# Patient Record
Sex: MALE | Race: WHITE | NOT HISPANIC OR LATINO | URBAN - METROPOLITAN AREA
[De-identification: names, ages, dates, MRNs, and addresses within clinical notes are randomized per-mention and may not be internally consistent; named-entity substitution may affect disease eponyms.]

---

## 2018-03-04 ENCOUNTER — EMERGENCY (EMERGENCY)
Facility: HOSPITAL | Age: 63
LOS: 1 days | Discharge: HOME | End: 2018-03-04
Attending: EMERGENCY MEDICINE | Admitting: EMERGENCY MEDICINE
Payer: MEDICARE

## 2018-03-04 VITALS
HEART RATE: 87 BPM | TEMPERATURE: 98 F | SYSTOLIC BLOOD PRESSURE: 145 MMHG | DIASTOLIC BLOOD PRESSURE: 74 MMHG | RESPIRATION RATE: 16 BRPM | OXYGEN SATURATION: 99 %

## 2018-03-04 VITALS
TEMPERATURE: 98 F | RESPIRATION RATE: 18 BRPM | HEART RATE: 64 BPM | SYSTOLIC BLOOD PRESSURE: 123 MMHG | DIASTOLIC BLOOD PRESSURE: 69 MMHG | OXYGEN SATURATION: 97 %

## 2018-03-04 DIAGNOSIS — R69 ILLNESS, UNSPECIFIED: ICD-10-CM

## 2018-03-04 DIAGNOSIS — F33.2 MAJOR DEPRESSIVE DISORDER, RECURRENT SEVERE WITHOUT PSYCHOTIC FEATURES: ICD-10-CM

## 2018-03-04 PROCEDURE — 90792 PSYCH DIAG EVAL W/MED SRVCS: CPT

## 2018-03-04 PROCEDURE — 99283 EMERGENCY DEPT VISIT LOW MDM: CPT

## 2018-03-04 PROCEDURE — 99284 EMERGENCY DEPT VISIT MOD MDM: CPT

## 2018-03-04 NOTE — ED BEHAVIORAL HEALTH ASSESSMENT NOTE - SUMMARY
Patient a 63 y/o single  male, unemployed, on SSD, domiciled at [facility] since yesterday, no prior drug/legal abuse, has SI, never tried to commit Suicide, no self aggravation or self mutilation habits, medically has CKD, plans to start HD tomorrow, with past psychiatric hx fo MDD with 4 past psychiatric hospitalizations, last ED visit was in  and was discharged from ED at [place].    Patient is alone, his parents , his brother  in , has no contact with his sister, was discharged from [facility] yesterday after a 7-10 stay where he had AVF in the Right Fore-arm and Rt. Jugular Vein for IV access to HD starting tomorrow. he is depressed for years, under the treatment at [facility] [place], sees a therapist once a week on  @ 9:45 AM and also sees his psychiatrist at least from once a week to a month depending upon his needs. He has SI for years, but never acted on them, denied drug abuse, complain t with meds, takes Zoloft/Remron as prescribed. he just mentioned to the rehab and Dialysis center and was sent in here. He denied current SI and contracted for safety, he took his meds today AM and plans to resume HD @ [facility] starting tomorrow, and also to see therapist Lindsey on Friday @ 9:45 AM.     He denied A/V/H or paranoid beliefs, no manic or hypo-manic symptoms are elicited

## 2018-03-04 NOTE — ED BEHAVIORAL HEALTH NOTE - BEHAVIORAL HEALTH NOTE
Sheree: pt seen by psych MD (Ruby) pt T&R ,pt has an appt on March 09,2018 with therapist Danya at [facility]. Transportation assistance requested,worker will call [employer].

## 2018-03-04 NOTE — ED PROVIDER NOTE - OBJECTIVE STATEMENT
63 y/o M pt with hx of HTN and PVD presents to ED from [facility] for making suicidal statements 1 week ago. Pt was seen at [facility] 1 week ago and was put in the hallway against the wall. There was a phone near his bed and he said he wanted to wrap the cord around his neck. He states he said it for attention and he is not suicidal. Pt's rehab facility found out today that he made suicidal statements 1 week ago and sent him to ED for evaluation and psych clearance. He states he has no plan or intention to harm himself.  Denies SI, HI. No further complaints at this time.

## 2018-03-04 NOTE — ED ADULT NURSE NOTE - CHIEF COMPLAINT QUOTE
[facility] for psych eval for statements pt stated yesterday about hurting himself. pt denies any suicidal thoughts at this time, md shaw at bedside

## 2018-03-04 NOTE — ED ADULT NURSE NOTE - OBJECTIVE STATEMENT
received pt medically cleared by ED attending, pt awake alert and cooperative, full affect, pt speaks coherently and mentates well, pt states he was sent for psych eval because he told staff at [facility] that he has a "History of suicidal thoughts" pt was brought there yesterday for P/T rehab and dialysis, states he told staff that he does "not have any current suicidal thoughts" but was assigned a 1:1 by nursing staff. pt has had suicidal ideations for the past 5 years with no suicide attempts,  pt denies any SI, HI, or AVH at this time, pt next dialysis appointment is Tuesday.

## 2018-03-04 NOTE — ED ADULT TRIAGE NOTE - CHIEF COMPLAINT QUOTE
[facility] for psych eval for statements pt stated yesterday about hurting himself. pt denies any suicidal thoughts at this time, md shaw at bedside

## 2018-03-04 NOTE — ED PROVIDER NOTE - MEDICAL DECISION MAKING DETAILS
Pt with hx of suicidal statement 1 week ago, sent for psych eval. Pt was seen, evaluated and cleared by psych for discharge

## 2018-03-04 NOTE — ED BEHAVIORAL HEALTH ASSESSMENT NOTE - HPI (INCLUDE ILLNESS QUALITY, SEVERITY, DURATION, TIMING, CONTEXT, MODIFYING FACTORS, ASSOCIATED SIGNS AND SYMPTOMS)
Patient a 61 y/o single  male, unemployed, on SSD, domiciled at [facility] since yesterday, no prior drug/legal abuse, has SI, never tried to commit Suicide, no self aggravation or self mutilation habits, medically has CKD, plans to start HD tomorrow, with past psychiatric hx fo MDD with 4 past psychiatric hospitalizations, last ED visit was in  and was discharged from ED at [place].    Patient is alone, his parents , his brother  in , has no contact with his sister, was discharged from [facility] yesterday after a 7-10 stay where he had AVF in the Right Fore-arm and Rt. Jugular Vein for IV access to HD starting tomorrow. he is depressed for years, under the treatment at [facility] [place], sees a therapist once a week on  @ 9:45 AM and also sees his psychiatrist at least from once a week to a month depending upon his needs. He has SI for years, but never acted on them, denied drug abuse, complain t with meds, takes Zoloft/Remron as prescribed. he just mentioned to the rehab and Dialysis center and was sent in here. He denied current SI and contracted for safety, he took his meds today AM and plans to resume HD @ [facility] starting tomorrow, and also to see therapist Lindsey on Friday @ 9:45 AM.     He denied A/V/H or paranoid beliefs, no manic or hypo-manic symptoms are elicited Patient a 61 y/o single  male, unemployed, on SSD, domiciled at [facility] [place] [facility] since yesterday, no prior drug/legal abuse, has SI, never tried to commit Suicide, no self aggravation or self mutilation habits, medically has CKD, plans to start HD tomorrow, with past psychiatric hx fo MDD with 4 past psychiatric hospitalizations, last ED visit was in 2016 and was discharged from ED at [place].    Patient is alone, his parents , his brother  in , has no contact with his sister, was discharged from [facility] yesterday after a 7-10 stay where he had AVF in the Right Fore-arm and Rt. Jugular Vein for IV access to HD starting tomorrow. he is depressed for years, under the treatment at [facility] [place], sees a therapist once a week on  @ 9:45 AM and also sees his psychiatrist at least from once a week to a month depending upon his needs. He has SI for years, but never acted on them, denied drug abuse, complain t with meds, takes Zoloft/Remron as prescribed. he just mentioned to the rehab and Dialysis center and was sent in here. He denied current SI and contracted for safety, he took his meds today AM and plans to resume HD @ [facility] [place] starting tomorrow, and also to see therapist Lindsey on Friday @ 9:45 AM.     He denied A/V/H or paranoid beliefs, no manic or hypo-manic symptoms are elicited.    Currently he is stable, not S/H at this time, he contracted for safety and plans to resume his HD tomorrow at Rehab[facility]Dialysis @ [facility] [place].

## 2018-10-10 ENCOUNTER — APPOINTMENT (OUTPATIENT)
Dept: NEUROLOGY | Facility: CLINIC | Age: 63
End: 2018-10-10
Payer: MEDICARE

## 2018-10-10 VITALS
DIASTOLIC BLOOD PRESSURE: 70 MMHG | BODY MASS INDEX: 39.08 KG/M2 | WEIGHT: 273 LBS | SYSTOLIC BLOOD PRESSURE: 132 MMHG | HEIGHT: 70 IN

## 2018-10-10 DIAGNOSIS — Z86.79 PERSONAL HISTORY OF OTHER DISEASES OF THE CIRCULATORY SYSTEM: ICD-10-CM

## 2018-10-10 DIAGNOSIS — Z99.2 DEPENDENCE ON RENAL DIALYSIS: ICD-10-CM

## 2018-10-10 DIAGNOSIS — G72.9 MYOPATHY, UNSPECIFIED: ICD-10-CM

## 2018-10-10 PROCEDURE — 99204 OFFICE O/P NEW MOD 45 MIN: CPT

## 2018-10-10 RX ORDER — SEVELAMER CARBONATE 800 MG/1
0.8 POWDER, FOR SUSPENSION ORAL
Refills: 0 | Status: ACTIVE | COMMUNITY

## 2018-10-10 RX ORDER — NIFEDIPINE 90 MG/1
90 TABLET, EXTENDED RELEASE ORAL
Refills: 0 | Status: ACTIVE | COMMUNITY

## 2018-10-10 RX ORDER — VIT B COMP NO.3/FOLIC/C/BIOTIN 1 MG-60 MG
TABLET ORAL
Refills: 0 | Status: ACTIVE | COMMUNITY

## 2018-10-10 RX ORDER — SERTRALINE HYDROCHLORIDE 25 MG/1
TABLET, FILM COATED ORAL
Refills: 0 | Status: ACTIVE | COMMUNITY

## 2018-11-23 NOTE — ED BEHAVIORAL HEALTH ASSESSMENT NOTE - REFERRAL / APPOINTMENT DETAILS
Clinic Care Coordination Contact  [facility]       Clinical Data: Care Coordinator Outreach  Outreach attempted x 1.  Left message on voicemail with call back information and requested return call.       Care Coordinator reviewed chart and patient discharged from [facility] on [place], with a plan to move to [facility] with hospice involved. Stopping cancer treatment. Recommended to follow up with PCP in 7 days. Told patient to schedule an appt in voicemail.     Care Coordinator will plan to follow up in one week to check on psychosocial status and if any other support/resources are needed.    Mari Otoole, MSW, [facility]  Clinic Care Coordinator  [email or web address]  [phone]       [facility] @ [place] on Friday 9:45 AM with therapist Lindsey

## 2018-11-28 ENCOUNTER — APPOINTMENT (OUTPATIENT)
Dept: NEUROLOGY | Facility: CLINIC | Age: 63
End: 2018-11-28
Payer: MEDICARE

## 2018-11-28 PROCEDURE — 95911 NRV CNDJ TEST 9-10 STUDIES: CPT

## 2018-11-28 PROCEDURE — 95937 NEUROMUSCULAR JUNCTION TEST: CPT

## 2018-11-28 PROCEDURE — 95886 MUSC TEST DONE W/N TEST COMP: CPT

## 2019-02-01 ENCOUNTER — INPATIENT (INPATIENT)
Facility: HOSPITAL | Age: 64
LOS: 2 days | Discharge: HOME | End: 2019-02-04
Attending: HOSPITALIST | Admitting: HOSPITALIST
Payer: MEDICARE

## 2019-02-01 VITALS
HEART RATE: 76 BPM | TEMPERATURE: 98 F | HEIGHT: 70 IN | SYSTOLIC BLOOD PRESSURE: 125 MMHG | OXYGEN SATURATION: 100 % | RESPIRATION RATE: 19 BRPM | WEIGHT: 279.99 LBS | DIASTOLIC BLOOD PRESSURE: 90 MMHG

## 2019-02-01 DIAGNOSIS — E66.9 OBESITY, UNSPECIFIED: ICD-10-CM

## 2019-02-01 DIAGNOSIS — N18.6 END STAGE RENAL DISEASE: ICD-10-CM

## 2019-02-01 DIAGNOSIS — I25.10 ATHEROSCLEROTIC HEART DISEASE OF NATIVE CORONARY ARTERY WITHOUT ANGINA PECTORIS: ICD-10-CM

## 2019-02-01 DIAGNOSIS — D63.1 ANEMIA IN CHRONIC KIDNEY DISEASE: ICD-10-CM

## 2019-02-01 DIAGNOSIS — R20.0 ANESTHESIA OF SKIN: ICD-10-CM

## 2019-02-01 DIAGNOSIS — R29.810 FACIAL WEAKNESS: ICD-10-CM

## 2019-02-01 DIAGNOSIS — I73.9 PERIPHERAL VASCULAR DISEASE, UNSPECIFIED: ICD-10-CM

## 2019-02-01 DIAGNOSIS — F32.9 MAJOR DEPRESSIVE DISORDER, SINGLE EPISODE, UNSPECIFIED: ICD-10-CM

## 2019-02-01 DIAGNOSIS — I49.3 VENTRICULAR PREMATURE DEPOLARIZATION: ICD-10-CM

## 2019-02-01 DIAGNOSIS — D50.9 IRON DEFICIENCY ANEMIA, UNSPECIFIED: ICD-10-CM

## 2019-02-01 DIAGNOSIS — Z88.8 ALLERGY STATUS TO OTHER DRUGS, MEDICAMENTS AND BIOLOGICAL SUBSTANCES: ICD-10-CM

## 2019-02-01 DIAGNOSIS — I44.7 LEFT BUNDLE-BRANCH BLOCK, UNSPECIFIED: ICD-10-CM

## 2019-02-01 DIAGNOSIS — I12.0 HYPERTENSIVE CHRONIC KIDNEY DISEASE WITH STAGE 5 CHRONIC KIDNEY DISEASE OR END STAGE RENAL DISEASE: ICD-10-CM

## 2019-02-01 DIAGNOSIS — I69.392 FACIAL WEAKNESS FOLLOWING CEREBRAL INFARCTION: ICD-10-CM

## 2019-02-01 LAB
ADD ON TEST-SPECIMEN IN LAB: SIGNIFICANT CHANGE UP
ALBUMIN SERPL ELPH-MCNC: 3.2 G/DL — LOW (ref 3.3–5)
ALP SERPL-CCNC: 119 U/L — SIGNIFICANT CHANGE UP (ref 40–120)
ALT FLD-CCNC: 25 U/L — SIGNIFICANT CHANGE UP (ref 12–78)
ANION GAP SERPL CALC-SCNC: 8 MMOL/L — SIGNIFICANT CHANGE UP (ref 5–17)
APTT BLD: 26.3 SEC — LOW (ref 27.5–36.3)
AST SERPL-CCNC: 19 U/L — SIGNIFICANT CHANGE UP (ref 15–37)
BASOPHILS # BLD AUTO: 0.06 K/UL — SIGNIFICANT CHANGE UP (ref 0–0.2)
BASOPHILS NFR BLD AUTO: 0.5 % — SIGNIFICANT CHANGE UP (ref 0–2)
BILIRUB SERPL-MCNC: 0.4 MG/DL — SIGNIFICANT CHANGE UP (ref 0.2–1.2)
BUN SERPL-MCNC: 35 MG/DL — HIGH (ref 7–23)
CALCIUM SERPL-MCNC: 8.6 MG/DL — SIGNIFICANT CHANGE UP (ref 8.5–10.1)
CHLORIDE SERPL-SCNC: 104 MMOL/L — SIGNIFICANT CHANGE UP (ref 96–108)
CO2 SERPL-SCNC: 28 MMOL/L — SIGNIFICANT CHANGE UP (ref 22–31)
CREAT SERPL-MCNC: 7.47 MG/DL — HIGH (ref 0.5–1.3)
EOSINOPHIL # BLD AUTO: 0.45 K/UL — SIGNIFICANT CHANGE UP (ref 0–0.5)
EOSINOPHIL NFR BLD AUTO: 4 % — SIGNIFICANT CHANGE UP (ref 0–6)
GLUCOSE SERPL-MCNC: 82 MG/DL — SIGNIFICANT CHANGE UP (ref 70–99)
HCT VFR BLD CALC: 33.5 % — LOW (ref 39–50)
HGB BLD-MCNC: 11.3 G/DL — LOW (ref 13–17)
IMM GRANULOCYTES NFR BLD AUTO: 0.4 % — SIGNIFICANT CHANGE UP (ref 0–1.5)
INR BLD: 1.1 RATIO — SIGNIFICANT CHANGE UP (ref 0.88–1.16)
LYMPHOCYTES # BLD AUTO: 19.6 % — SIGNIFICANT CHANGE UP (ref 13–44)
LYMPHOCYTES # BLD AUTO: 2.2 K/UL — SIGNIFICANT CHANGE UP (ref 1–3.3)
MCHC RBC-ENTMCNC: 32.6 PG — SIGNIFICANT CHANGE UP (ref 27–34)
MCHC RBC-ENTMCNC: 33.7 GM/DL — SIGNIFICANT CHANGE UP (ref 32–36)
MCV RBC AUTO: 96.5 FL — SIGNIFICANT CHANGE UP (ref 80–100)
MONOCYTES # BLD AUTO: 1.01 K/UL — HIGH (ref 0–0.9)
MONOCYTES NFR BLD AUTO: 9 % — SIGNIFICANT CHANGE UP (ref 2–14)
NEUTROPHILS # BLD AUTO: 7.44 K/UL — HIGH (ref 1.8–7.4)
NEUTROPHILS NFR BLD AUTO: 66.5 % — SIGNIFICANT CHANGE UP (ref 43–77)
NRBC # BLD: 0 /100 WBCS — SIGNIFICANT CHANGE UP (ref 0–0)
PLATELET # BLD AUTO: 301 K/UL — SIGNIFICANT CHANGE UP (ref 150–400)
POTASSIUM SERPL-MCNC: 3.8 MMOL/L — SIGNIFICANT CHANGE UP (ref 3.5–5.3)
POTASSIUM SERPL-SCNC: 3.8 MMOL/L — SIGNIFICANT CHANGE UP (ref 3.5–5.3)
PROT SERPL-MCNC: 8.2 GM/DL — SIGNIFICANT CHANGE UP (ref 6–8.3)
PROTHROM AB SERPL-ACNC: 12.3 SEC — SIGNIFICANT CHANGE UP (ref 10–12.9)
RBC # BLD: 3.47 M/UL — LOW (ref 4.2–5.8)
RBC # FLD: 13.4 % — SIGNIFICANT CHANGE UP (ref 10.3–14.5)
SODIUM SERPL-SCNC: 140 MMOL/L — SIGNIFICANT CHANGE UP (ref 135–145)
TROPONIN I SERPL-MCNC: <0.015 NG/ML — SIGNIFICANT CHANGE UP (ref 0.01–0.04)
TSH SERPL-MCNC: 1.25 UU/ML — SIGNIFICANT CHANGE UP (ref 0.34–4.82)
WBC # BLD: 11.21 K/UL — HIGH (ref 3.8–10.5)
WBC # FLD AUTO: 11.21 K/UL — HIGH (ref 3.8–10.5)

## 2019-02-01 PROCEDURE — 70551 MRI BRAIN STEM W/O DYE: CPT | Mod: 26

## 2019-02-01 PROCEDURE — 72198 MR ANGIO PELVIS W/O & W/DYE: CPT | Mod: 26

## 2019-02-01 PROCEDURE — 93010 ELECTROCARDIOGRAM REPORT: CPT

## 2019-02-01 PROCEDURE — 99283 EMERGENCY DEPT VISIT LOW MDM: CPT

## 2019-02-01 PROCEDURE — 99291 CRITICAL CARE FIRST HOUR: CPT

## 2019-02-01 PROCEDURE — 70547 MR ANGIOGRAPHY NECK W/O DYE: CPT | Mod: 26

## 2019-02-01 PROCEDURE — 71045 X-RAY EXAM CHEST 1 VIEW: CPT | Mod: 26

## 2019-02-01 PROCEDURE — 70450 CT HEAD/BRAIN W/O DYE: CPT | Mod: 26

## 2019-02-01 RX ORDER — ASPIRIN/CALCIUM CARB/MAGNESIUM 324 MG
325 TABLET ORAL DAILY
Qty: 0 | Refills: 0 | Status: DISCONTINUED | OUTPATIENT
Start: 2019-02-02 | End: 2019-02-04

## 2019-02-01 RX ORDER — CALCIUM ACETATE 667 MG
1 TABLET ORAL
Qty: 0 | Refills: 0 | COMMUNITY

## 2019-02-01 RX ORDER — ATORVASTATIN CALCIUM 80 MG/1
40 TABLET, FILM COATED ORAL AT BEDTIME
Qty: 0 | Refills: 0 | Status: DISCONTINUED | OUTPATIENT
Start: 2019-02-01 | End: 2019-02-04

## 2019-02-01 RX ORDER — MIRTAZAPINE 45 MG/1
7.5 TABLET, ORALLY DISINTEGRATING ORAL AT BEDTIME
Qty: 0 | Refills: 0 | Status: DISCONTINUED | OUTPATIENT
Start: 2019-02-01 | End: 2019-02-04

## 2019-02-01 RX ORDER — SERTRALINE 25 MG/1
100 TABLET, FILM COATED ORAL DAILY
Qty: 0 | Refills: 0 | Status: DISCONTINUED | OUTPATIENT
Start: 2019-02-01 | End: 2019-02-04

## 2019-02-01 RX ORDER — ARIPIPRAZOLE 15 MG/1
2 TABLET ORAL DAILY
Qty: 0 | Refills: 0 | Status: DISCONTINUED | OUTPATIENT
Start: 2019-02-01 | End: 2019-02-01

## 2019-02-01 RX ORDER — PANTOPRAZOLE SODIUM 20 MG/1
40 TABLET, DELAYED RELEASE ORAL
Qty: 0 | Refills: 0 | Status: DISCONTINUED | OUTPATIENT
Start: 2019-02-01 | End: 2019-02-04

## 2019-02-01 RX ORDER — MAGNESIUM OXIDE 400 MG ORAL TABLET 241.3 MG
1 TABLET ORAL
Qty: 0 | Refills: 0 | COMMUNITY

## 2019-02-01 RX ORDER — SUCRALFATE 1 G
1 TABLET ORAL
Qty: 0 | Refills: 0 | COMMUNITY

## 2019-02-01 RX ORDER — NIFEDIPINE 30 MG
90 TABLET, EXTENDED RELEASE 24 HR ORAL DAILY
Qty: 0 | Refills: 0 | Status: DISCONTINUED | OUTPATIENT
Start: 2019-02-01 | End: 2019-02-04

## 2019-02-01 RX ORDER — MIRTAZAPINE 45 MG/1
1 TABLET, ORALLY DISINTEGRATING ORAL
Qty: 0 | Refills: 0 | COMMUNITY

## 2019-02-01 RX ORDER — PANTOPRAZOLE SODIUM 20 MG/1
1 TABLET, DELAYED RELEASE ORAL
Qty: 0 | Refills: 0 | COMMUNITY

## 2019-02-01 RX ORDER — DOCUSATE SODIUM 100 MG
100 CAPSULE ORAL THREE TIMES A DAY
Qty: 0 | Refills: 0 | Status: DISCONTINUED | OUTPATIENT
Start: 2019-02-01 | End: 2019-02-04

## 2019-02-01 RX ORDER — ARIPIPRAZOLE 15 MG/1
1 TABLET ORAL
Qty: 0 | Refills: 0 | COMMUNITY

## 2019-02-01 RX ORDER — SUCRALFATE 1 G
1 TABLET ORAL
Qty: 0 | Refills: 0 | Status: DISCONTINUED | OUTPATIENT
Start: 2019-02-01 | End: 2019-02-04

## 2019-02-01 RX ORDER — ACETAMINOPHEN 500 MG
650 TABLET ORAL EVERY 6 HOURS
Qty: 0 | Refills: 0 | Status: DISCONTINUED | OUTPATIENT
Start: 2019-02-01 | End: 2019-02-04

## 2019-02-01 RX ORDER — SERTRALINE 25 MG/1
2 TABLET, FILM COATED ORAL
Qty: 0 | Refills: 0 | COMMUNITY

## 2019-02-01 RX ORDER — MULTIVIT-MIN/FERROUS GLUCONATE 9 MG/15 ML
1 LIQUID (ML) ORAL
Qty: 0 | Refills: 0 | COMMUNITY

## 2019-02-01 RX ORDER — HYDRALAZINE HCL 50 MG
100 TABLET ORAL
Qty: 0 | Refills: 0 | Status: DISCONTINUED | OUTPATIENT
Start: 2019-02-01 | End: 2019-02-04

## 2019-02-01 RX ORDER — ASPIRIN/CALCIUM CARB/MAGNESIUM 324 MG
325 TABLET ORAL ONCE
Qty: 0 | Refills: 0 | Status: COMPLETED | OUTPATIENT
Start: 2019-02-01 | End: 2019-02-01

## 2019-02-01 RX ADMIN — Medication 325 MILLIGRAM(S): at 15:26

## 2019-02-01 RX ADMIN — Medication 100 MILLIGRAM(S): at 21:02

## 2019-02-01 RX ADMIN — Medication 0.1 MILLIGRAM(S): at 18:24

## 2019-02-01 RX ADMIN — Medication 1 GRAM(S): at 23:35

## 2019-02-01 RX ADMIN — Medication 1 GRAM(S): at 18:25

## 2019-02-01 RX ADMIN — SERTRALINE 100 MILLIGRAM(S): 25 TABLET, FILM COATED ORAL at 21:02

## 2019-02-01 RX ADMIN — Medication 90 MILLIGRAM(S): at 21:02

## 2019-02-01 RX ADMIN — ATORVASTATIN CALCIUM 40 MILLIGRAM(S): 80 TABLET, FILM COATED ORAL at 21:02

## 2019-02-01 NOTE — ED ADULT TRIAGE NOTE - SOURCE OF INFORMATION
December 18, 2018        Nader Chiu  [address]  [address]  [place] [address]  Phone: [phone]  Fax: [phone]             [facility] [address] [place] [address]  Phone: [phone]   Patient: Tim Richards   MR Number: [id number]   YOB: 1964   Date of Visit: 12/6/2018       Dear Dr. Nader Chiu    Thank you for referring Tim Richards to me for evaluation. Attached you will find relevant portions of my assessment and plan of care.    If you have questions, please do not hesitate to call me. I look forward to following Tim Richards along with you.    Sincerely,    Amy Rhodes MD    Enclosure    If you would like to receive this communication electronically, please contact [email or web address] or [phone] to request [employer] Link access.    [employer] Link is a tool which provides read-only access to select patient information with whom you have a relationship. Its easy to use and provides real time access to review your patients record including encounter summaries, notes, results, and demographic information.    If you feel you have received this communication in error or would no longer like to receive these types of communications, please e-mail [email or web address]       
Patient/EMS

## 2019-02-01 NOTE — ED ADULT NURSE NOTE - CHPI ED NUR SYMPTOMS NEG
no confusion/no change in level of consciousness/no loss of consciousness/no nausea/no numbness/no weakness/no blurred vision/no dizziness/no vomiting/no fever

## 2019-02-01 NOTE — ED ADULT NURSE NOTE - NSIMPLEMENTINTERV_GEN_ALL_ED
Implemented All Universal Safety Interventions:  [place] to call system. Call bell, personal items and telephone within reach. Instruct patient to call for assistance. Room bathroom lighting operational. Non-slip footwear when patient is off stretcher. Physically safe environment: no spills, clutter or unnecessary equipment. Stretcher in lowest position, wheels locked, appropriate side rails in place.

## 2019-02-01 NOTE — H&P ADULT - ASSESSMENT
63M.  admitted 02/01/2019.  presented from his psychotherapist's office in [place] to ED due to a L sided facial droop.  this was noted by the therapist (not the patient, as he purportedly never looks at himself in the mirror).  EMS was called.  Code stroke was not called at triage, has it was unknown onset of symptom.    PMHx:  HTN;  ESRT-RRT TTS + RUE AVF;  severe depression.    L facial droop.  new v. chronic.  -r/o TIA/CVA.  -HCT, no acute findings.  chronic lacunar infarction.  -MR head + MRA head and neck.  -ASA.  -statin.  -Neurology.    purported hx of "irregular" heart rhythm.  -r/o AF or atrial flutter.  -telemetry monitoring and serial EKGs.  -echocardiogram.  -EP evaluation, re:  consideration for implantable loop recorder.    mild normocytic anemia.  -recent hospitalization due to melena.  -monitor CBC and check stool OB.  -start Protonix.  -c/w sucralfate.    ESRT-RRT TTS + RUE AVF.  -Nephrology.    HTN.  -c/w hydralazine, clonidine and nifedipine.    severe depression.  -patient denied thoughts of harming himself.  -c/w sertraline and mirtazapine.    DVT prophylaxis.  -SCD.    disposition.  -telemetry to.    communication.  -RN.  -Case Management. 63M.  admitted 02/01/2019.  presented from his psychotherapist's office in [place] to ED due to a L sided facial droop.  this was noted by the therapist (not the patient, as he purportedly never looks at himself in the mirror).  EMS was called.  Code stroke was not called at triage, has it was unknown onset of symptom.    PMHx:  HTN;  ESRT-RRT TTS + RUE AVF;  severe depression.    L facial droop.  new v. chronic.  -r/o TIA/CVA.  -HCT, no acute findings.  chronic lacunar infarction.  -MR head + MRA head and neck.  -ASA.  -statin.  -Neurology.    hx of "irregular" heart rhythm.  -r/o AF or atrial flutter.  -telemetry monitoring and serial EKGs.  -echocardiogram.  -EP evaluation, re:  consideration for implantable loop recorder.    mild normocytic anemia.  -recent hospitalization due to melena.  -monitor CBC and check stool OB.  -start Protonix.  -c/w sucralfate.    ESRT-RRT TTS + RUE AVF.  -Nephrology.    HTN.  -c/w hydralazine, clonidine and nifedipine.    severe depression.  -patient denied thoughts of harming himself.  -c/w sertraline and mirtazapine.    DVT prophylaxis.  -SCD.    disposition.  -telemetry to.    communication.  -RN.  -Case Management.

## 2019-02-01 NOTE — ED ADULT TRIAGE NOTE - CHIEF COMPLAINT QUOTE
pt brought by EMS from therapist session for eval of left lip droop. no slurring of speech or dysarthria. pt evaled by resident Jeny on arrival. unknown time of onset.

## 2019-02-01 NOTE — ED PROVIDER NOTE - CLINICAL SUMMARY MEDICAL DECISION MAKING FREE TEXT BOX
Israel Fermin (Resident): ESRD and HTN w/ L facial droop, isolated - concern for minor stroke carlos. given patient had hx of poss a fib at OSH few weeks ago - no code stroke called, unknown last normal - will d/w neuro, CT head, labs, and admit as patient not on ASA/Plavix of HLD medication    JW Agree with A/P above.  Unclear last normal, ne focal deficit.  DDX CVA, CN 7 palsy, Nelson Palsy.  CT, metabolic and hematologic evaluation, neuro consultation, admission for risk stratification and secondary prevention.  Symptomatic treatment and reassessment.

## 2019-02-01 NOTE — H&P ADULT - HISTORY OF PRESENT ILLNESS
63M.  admitted 02/01/2019.  presented from his psychotherapist's office in [place] to ED due to a L sided facial droop.  this was noted by the therapist (not the patient, as he purportedly never looks at himself in the mirror).  EMS was called.  Code stroke was not called at triage, has it was unknown onset of symptom.    incidentally, patient was admitted to [facility] 3 weeks prior to current admission due to "black stool."  patient underwent an EGD which found an "old, nonbleeding ucler" according to patient.  additionally, we was informed he had an, "irregular heart beat."  cardiology w/u at that time was unrevealing and were unable to document a reoccurrence.      ROS:  (-) HA, visual changes, dysarthria, dysmetria, motor weakness or ataxia, recurrent melena or hematochezia, suicidal or homicidal ideations.    PMHx:  HTN;  ESRT-RRT TTS + RUE AVF;  depression.    PSHx:  RUE AVF;  L foot sx.    ALL:  doxazosin.    Rx:  reviewed.    SocHx:  on disability due to ESRD.  previously, worked numerous odd jobs.  denied tobacco, EtOH or illegal drugs.

## 2019-02-01 NOTE — ED PROVIDER NOTE - ATTENDING CONTRIBUTION TO CARE
GRZEGORZ Fisher MD,  performed the initial face to face bedside interview with this patient regarding history of present illness, review of symptoms and relevant past medical, social and family history.  I completed an independent physical examination.  I was the initial provider who evaluated this patient. I have signed out the follow up of any pending tests (i.e. labs, radiological studies) to the resident.  I have communicated the patient’s plan of care and disposition with the resident.

## 2019-02-01 NOTE — CONSULT NOTE ADULT - ASSESSMENT
63 year old man hx of HTN, CKD on HD, presents with an unknown onset left facial weakness, reduced sensory of LLE, ? old. r/o right CVA. No TPA due to unknown onset, min signs on exam.

## 2019-02-01 NOTE — ED PROVIDER NOTE - CRITICAL CARE PROVIDED
additional history taking/conducted a detailed discussion of DNR status/direct patient care (not related to procedure)/documentation/interpretation of diagnostic studies

## 2019-02-01 NOTE — ED PROVIDER NOTE - CARDIAC, MLM
Normal rate, regular rhythm.  Heart sounds S1, S2.  No murmurs, rubs or gallops. R arm fistula good thrill

## 2019-02-01 NOTE — ED PROVIDER NOTE - PROGRESS NOTE DETAILS
Justification for admission CVA.  Given presentation and findings, patient requires hospitalization.  I discussed all findings from our evaluation today with the patient and the medical necessity for admission to the hospital.  I addressed expectations regarding the admission and I discussed the plan of care in detail.  I addressed all questions and concerns regarding the admission and the plan of care.  I used verbal teach back to confirm understanding.  The patient agreed with the admission and the plan of care.  Pt signed out to Dr. D'Amico by resident.  Patient's history, vital signs, lab results, imaging, pertinent findings, and clinical condition reviewed during sign out.  At sign out patient admitted in hemodynamically stable condition in no acute distress.

## 2019-02-01 NOTE — ED PROVIDER NOTE - OBJECTIVE STATEMENT
62 y/o male hx of HTN and ESRD presents with L facial droop. Per patient, has severe depression and was at therapy where they noticed he had a L sided facial droop so called EMS. Patient never looks in mirror, and does not know when the facial droop started. Denies any vision changes, confusion, slurred speech, arm or leg weakness. Code stroke not called at triage as unknown last normal. Of note, was admitted to OSH 3 weeks ago, told he has an "irregular heart beat," but had a workup by cards and they were never able to prove recurrence of arrhythmia. 62 y/o male hx of HTN and ESRD presents with L facial droop. Per patient, has severe depression and was at therapy where they noticed he had a L sided facial droop so called EMS. Patient never looks in mirror, and does not know when the facial droop started. Denies any vision changes, confusion, slurred speech, arm or leg weakness. Code stroke not called at triage as unknown last normal. Of note, was admitted to OSH 3 weeks ago, told he has an "irregular heart beat," but had a workup by cards and they were never able to prove recurrence of arrhythmia.      JW Agree with history above.  Unclear last normal.  Significant new facial droop left sided.  OSH admission for possible AFIB.  No fevers, chills, sweats, weight loss, fatigue, or malaise. No other complaints.

## 2019-02-01 NOTE — H&P ADULT - NSHPLABSRESULTS_GEN_ALL_CORE
11.3   11.21 )-----------( 301      ( 01 Feb 2019 12:54 )             33.5     02-01    140  |  104  |  35<H>  ----------------------------<  82  3.8   |  28  |  7.47<H>    Ca    8.6      01 Feb 2019 12:54    TPro  8.2  /  Alb  3.2<L>  /  TBili  0.4  /  DBili  x   /  AST  19  /  ALT  25  /  AlkPhos  119  02-01 11.3   11.21 )-----------( 301      ( 01 Feb 2019 12:54 )             33.5     02-01    140  |  104  |  35<H>  ----------------------------<  82  3.8   |  28  |  7.47<H>    Ca    8.6      01 Feb 2019 12:54    TPro  8.2  /  Alb  3.2<L>  /  TBili  0.4  /  DBili  x   /  AST  19  /  ALT  25  /  AlkPhos  119  02-01    EKG NSR 71 1st degree AVB 0.218s.  QTc 0.484s.  PRWP.  NS ST-T changes.

## 2019-02-01 NOTE — ED PROVIDER NOTE - MEDICAL DECISION MAKING DETAILS
Israel Fermin (Resident): ESRD and HTN w/ L facial droop, isolated - concern for minor stroke carlos. given patient had hx of poss a fib at OSH few weeks ago - no code stroke called, unknown last normal - will d/w neuro, CT head, labs, and admit as patient not on ASA/Plavix of HLD medication

## 2019-02-01 NOTE — PATIENT PROFILE ADULT - FALL HARM RISK TYPE OF ASSESSMENT
Assessment/Plan:  Hypothyroidism  She is going to continue her current dose of levothyroxine number going to get a TSH today  COPD (chronic obstructive pulmonary disease) with acute bronchitis ([address] )  She continues with Symbicort as well as p r n  Albuterol use which she has not used much recently  Also needs to continue to work on discontinuing tobacco as we have discussed in the past     Neck pain  Continue with current pain management needs  Depression  She is going to continue with her sertraline  Her depression is relatively well controlled  She is not suicidal   She also continues to use Xanax for anxiety related to her multiple medical problems  Other chronic pain  She continues with her current pain management regimen which is fairly effective at relieving her chronic pain  Diagnoses and all orders for this visit:    Need for influenza vaccination  -     influenza vaccine, 3018-1128, quadrivalent, recombinant, PF, 0 5 mL, for patients 18 yr+ ([id number])    Neck pain  -     oxyCODONE-acetaminophen (PERCOCET) 5-325 mg per tablet; Take 1 tablet by mouth 2 (two) times a day as needed for severe pain Earliest Fill Date: 10/16/18 Max Daily Amount: 2 tablets    Other chronic pain  -     oxyCODONE-acetaminophen (PERCOCET) 5-325 mg per tablet; Take 1 tablet by mouth 2 (two) times a day as needed for severe pain Earliest Fill Date: 10/16/18 Max Daily Amount: 2 tablets    Depression, unspecified depression type  -     ALPRAZolam (XANAX) 0 5 mg tablet; Take 1 tablet (0 5 mg total) by mouth 3 (three) times a day    Encounter for screening mammogram for malignant neoplasm of breast    Hypothyroidism, unspecified type  -     CBC  -     Comprehensive metabolic panel  -     Lipid panel  -     TSH, 3rd generation with Free T4 reflex    COPD (chronic obstructive pulmonary disease) with acute bronchitis (HCC)  -     albuterol (PROVENTIL HFA,VENTOLIN HFA) 90 mcg/act inhaler;  Inhale 2 puffs every 4 (four) hours as needed for wheezing  -     Cancel: PNEUMOCOCCAL CONJUGATE VACCINE 13-VALENT GREATER THAN 6 MONTHS    Need for pneumococcal vaccination  -     PNEUMOCOCCAL CONJUGATE VACCINE 13-VALENT GREATER THAN 6 MONTHS    Need for hepatitis C screening test  -     Hepatitis C Ab W/Refl To HCV RNA, Qn, PCR    Other orders  -     Cancel: Mammo diagnostic bilateral w cad; Future          Subjective:   Chief Complaint   Patient presents with    med check     here for a med check and refills today  she will get fbw done and she had a flu shot  i will get her colonoscopy  Patient ID: Cedric Negron is a 66 y o  female  I had a colitis flare 2-3 weeks ago  No heme and now improved  Xanax 1 HS, first am and then 1/2 twice during day  HPI   Patient is a 82-year-old female who presents today for follow-up of multiple medical problems including hypothyroidism, anxiety and depression, chronic pain, COPD in addition to other  She also has severely underweight condition felt to be related to some degree of protein calorie malnutrition  We do note that she has gained 5 lb since her last visit  She continues to have chronic pain including her neck shows right shoulder and knees  This frequently wakes her up at night  She continues on her regimen of hydrocodone as well as oxycodone in regards to her pain which makes it tolerable for the most part  She continues on sertraline and alprazolam for her anxiety and depression  She continues on Symbicort and albuterol for her COPD which has been relatively well controlled  She is compliant with her levothyroxine for hypothyroidism  My necks and knees hurt everyday " so bad "  Nocturnal pain R shoulder     The following portions of the patient's history were reviewed and updated as appropriate: allergies, current medications, past family history, past medical history, past social history, past surgical history and problem list     Review of Systems   Constitution: Positive for weight gain  Negative for decreased appetite  6 pound weight gain since January  Cardiovascular: Negative  Respiratory: Negative for cough, hemoptysis, shortness of breath, sputum production and wheezing  Musculoskeletal:        Neck and shoulder pain which is referred  Gastrointestinal: Negative for bloating, abdominal pain, constipation, diarrhea and hematochezia  Recent " colitis flare " improved  Dr Estelita Mares by history and no recent f/u  Genitourinary: Negative  Neurological: Negative for headaches  Psychiatric/Behavioral: Negative for depression  The patient has insomnia and is nervous/anxious  Awakens due to shoulder, neck, knee pian  Objective:    Physical Exam   Constitutional: She is oriented to person, place, and time  She appears well-developed  Underweight  Eyes: Conjunctivae are normal  No scleral icterus  Neck: Neck supple  No JVD present  No thyromegaly present  Cardiovascular: Normal rate, regular rhythm and normal heart sounds  Exam reveals no gallop  No murmur heard  Pulmonary/Chest: Effort normal    Somewhat distant but no crackle, rhonchi or wheeze  Musculoskeletal: She exhibits tenderness  She exhibits no edema  R shoulder with fairly good ROM  And no impingement sign  C Spine with somewhat limited lateral flexion bilaterally > rotation  Lymphadenopathy:     She has no cervical adenopathy  Neurological: She is alert and oriented to person, place, and time  Skin: No rash noted  Psychiatric: She has a normal mood and affect   Thought content normal  admission

## 2019-02-01 NOTE — ED PROVIDER NOTE - EYES, MLM
Clear bilaterally, pupils equal, round and reactive to light. EOMI. No scleral icterus. No conjunctival injection. No discharge bilaterally. No nystagmus appreciated bilaterally. Peripheral vision intact b/l.

## 2019-02-01 NOTE — H&P ADULT - NSHPPHYSICALEXAM_GEN_ALL_CORE
Vital Signs Last 24 Hrs  T(C): 36.6 (01 Feb 2019 13:00), Max: 36.6 (01 Feb 2019 13:00)  T(F): 97.9 (01 Feb 2019 13:00), Max: 97.9 (01 Feb 2019 13:00)  HR: 76 (01 Feb 2019 13:00) (76 - 76)  BP: 125/90 (01 Feb 2019 13:00) (125/90 - 125/90)  BP(mean): --  RR: 19 (01 Feb 2019 13:00) (19 - 19)  SpO2: 100% (01 Feb 2019 13:00) (100% - 100%)    Constitutional: chronically ill and tatterdemalion in appearance.  HEENT: (+) faint droop L upper corner of mouth.  Neck: Soft and supple, No carotid bruit, No JVD  Respiratory: Breath sounds are clear bilaterally, No wheezing, rales or rhonchi  Cardiovascular: S1 and S2, regular rate and rhythm, no murmur, rub or gallop.  Gastrointestinal: Bowel Sounds present, soft, nontender, nondistended, no guarding, no rebound, no mass.  Extremities: No peripheral edema  Vascular: (+) RUE AVF.  Neurological: A/O x 3, no focal deficits otherwise noted.  Musculoskeletal: 5/5 strength b/l upper and lower extremities  Skin:  no visible rashes.

## 2019-02-01 NOTE — CONSULT NOTE ADULT - SUBJECTIVE AND OBJECTIVE BOX
64 y/o male hx of HTN and ESRD presents with L facial droop. Per patient, has severe depression and was at therapy where they noticed he had a L sided facial droop so called EMS. Patient never looked in mirror, was at [facility]  yesterday, was not told he had any facial asymmetry. Denies headache, facial pain, no change in speech, swallowing, no dizziness, unilateral weakness or numbness. no recent illness.  Reports was admitted to another hosp, 3 weeks ago, told he has an "irregular heart beat," but had a workup, no reported findings.   Complains of chronic left leg numbness, weakness. Not worsening last few days.    PAST MEDICAL & SURGICAL HISTORY:  Kidney disease: needs fistula  PVD (peripheral vascular disease)  HTN (hypertension)  No significant past surgical history    FAMILY HISTORY:    Social Hx:  Nonsmoker, no drug or alcohol use  Medications and Allergies ReviewedMEDICATIONS  (STANDING):     ROS: Pertinent positives in HPI, all other ROS were reviewed and are negative.      Examination:   Vital Signs Last 24 Hrs  T(C): 36.6 (01 Feb 2019 13:00), Max: 36.6 (01 Feb 2019 13:00)  T(F): 97.9 (01 Feb 2019 13:00), Max: 97.9 (01 Feb 2019 13:00)  HR: 76 (01 Feb 2019 13:00) (76 - 76)  BP: 125/90 (01 Feb 2019 13:00) (125/90 - 125/90)  BP(mean): --  RR: 19 (01 Feb 2019 13:00) (19 - 19)  SpO2: 100% (01 Feb 2019 13:00) (100% - 100%)  General: Cooperative, NAD   NECK: supple, no masses  ENT: Normal hearing   Vascular : no carotid bruits,   Lungs: CTAB  Chest: RRR, no murmurs  Extremities: nontender, no edema  Musculoskeletal: no adventitious movements, RUE vascular shunt, no joint stiffness  Skin: no rash    Neurological Examination:  NIHSS:1  MS: AOx3. Appropriately interactive, normal affect. Speech fluent, can name and repeat   CN: VFFTC, PERLL, EOMI, V1-3 sensation intact, face asymmetric, mild left flattened NLF,  hearing intact, palate elevates symmetrically, tongue midline, SCM equal bilaterally  Motor: normal bulk and tone, no tremor, rigidity or bradykinesia.  5/5 all over   Sens: reduced light touch on left LE.    Reflexes: 0-1/4 all over, downgoing toes b/l  Coord:  No dysmetria, ALIRIO intact   Gait: Cannot test    Complete Blood Count + Automated Diff (02.01.19 @ 12:54)    WBC Count: 11.21 K/uL    RBC Count: 3.47 M/uL    Hemoglobin: 11.3 g/dL    Hematocrit: 33.5 %    Mean Cell Volume: 96.5 fl    Mean Cell Hemoglobin: 32.6 pg    Mean Cell Hemoglobin Conc: 33.7 gm/dL    Red Cell Distrib Width: 13.4 %    Platelet Count - Automated: 301 K/uL    POCT Blood Glucose.: 83 mg/dL (02.01.19 @ 13:05)

## 2019-02-01 NOTE — ED ADULT NURSE NOTE - OBJECTIVE STATEMENT
[facility] was meeting with his psychiatrist when the psych. noticed mild left sided lip droop at 1145.  Unknown when last normal.  PT denies neurological deficits.  No aphagia, weakness noted.  VSS, pt a/ox4, hx ESRD on HD TRS with right avf.

## 2019-02-01 NOTE — CONSULT NOTE ADULT - PROBLEM SELECTOR RECOMMENDATION 9
Ct head pending  If negative, then MR head, MRA head and neck.  admit for observation, monitored bed  asa, statin  2 d echo

## 2019-02-02 ENCOUNTER — TRANSCRIPTION ENCOUNTER (OUTPATIENT)
Age: 64
End: 2019-02-02

## 2019-02-02 LAB
ANION GAP SERPL CALC-SCNC: 11 MMOL/L — SIGNIFICANT CHANGE UP (ref 5–17)
BASOPHILS # BLD AUTO: 0.04 K/UL — SIGNIFICANT CHANGE UP (ref 0–0.2)
BASOPHILS NFR BLD AUTO: 0.4 % — SIGNIFICANT CHANGE UP (ref 0–2)
BUN SERPL-MCNC: 49 MG/DL — HIGH (ref 7–23)
CALCIUM SERPL-MCNC: 8.4 MG/DL — LOW (ref 8.5–10.1)
CHLORIDE SERPL-SCNC: 106 MMOL/L — SIGNIFICANT CHANGE UP (ref 96–108)
CO2 SERPL-SCNC: 23 MMOL/L — SIGNIFICANT CHANGE UP (ref 22–31)
CREAT SERPL-MCNC: 9.14 MG/DL — HIGH (ref 0.5–1.3)
EOSINOPHIL # BLD AUTO: 0.45 K/UL — SIGNIFICANT CHANGE UP (ref 0–0.5)
EOSINOPHIL NFR BLD AUTO: 4.6 % — SIGNIFICANT CHANGE UP (ref 0–6)
ESTIMATED AVERAGE GLUCOSE: 91 MG/DL — SIGNIFICANT CHANGE UP (ref 68–114)
GLUCOSE SERPL-MCNC: 94 MG/DL — SIGNIFICANT CHANGE UP (ref 70–99)
HBA1C BLD-MCNC: 4.8 % — SIGNIFICANT CHANGE UP (ref 4–5.6)
HCT VFR BLD CALC: 30.9 % — LOW (ref 39–50)
HCV AB S/CO SERPL IA: 0.14 S/CO — SIGNIFICANT CHANGE UP
HCV AB SERPL-IMP: SIGNIFICANT CHANGE UP
HGB BLD-MCNC: 10.1 G/DL — LOW (ref 13–17)
IMM GRANULOCYTES NFR BLD AUTO: 0.4 % — SIGNIFICANT CHANGE UP (ref 0–1.5)
LYMPHOCYTES # BLD AUTO: 2.06 K/UL — SIGNIFICANT CHANGE UP (ref 1–3.3)
LYMPHOCYTES # BLD AUTO: 21 % — SIGNIFICANT CHANGE UP (ref 13–44)
MCHC RBC-ENTMCNC: 32.1 PG — SIGNIFICANT CHANGE UP (ref 27–34)
MCHC RBC-ENTMCNC: 32.7 GM/DL — SIGNIFICANT CHANGE UP (ref 32–36)
MCV RBC AUTO: 98.1 FL — SIGNIFICANT CHANGE UP (ref 80–100)
MONOCYTES # BLD AUTO: 0.74 K/UL — SIGNIFICANT CHANGE UP (ref 0–0.9)
MONOCYTES NFR BLD AUTO: 7.6 % — SIGNIFICANT CHANGE UP (ref 2–14)
NEUTROPHILS # BLD AUTO: 6.47 K/UL — SIGNIFICANT CHANGE UP (ref 1.8–7.4)
NEUTROPHILS NFR BLD AUTO: 66 % — SIGNIFICANT CHANGE UP (ref 43–77)
NRBC # BLD: 0 /100 WBCS — SIGNIFICANT CHANGE UP (ref 0–0)
OB PNL STL: NEGATIVE — SIGNIFICANT CHANGE UP
PLATELET # BLD AUTO: 263 K/UL — SIGNIFICANT CHANGE UP (ref 150–400)
POTASSIUM SERPL-MCNC: 4.1 MMOL/L — SIGNIFICANT CHANGE UP (ref 3.5–5.3)
POTASSIUM SERPL-SCNC: 4.1 MMOL/L — SIGNIFICANT CHANGE UP (ref 3.5–5.3)
RBC # BLD: 3.15 M/UL — LOW (ref 4.2–5.8)
RBC # FLD: 13.3 % — SIGNIFICANT CHANGE UP (ref 10.3–14.5)
SODIUM SERPL-SCNC: 140 MMOL/L — SIGNIFICANT CHANGE UP (ref 135–145)
WBC # BLD: 9.8 K/UL — SIGNIFICANT CHANGE UP (ref 3.8–10.5)
WBC # FLD AUTO: 9.8 K/UL — SIGNIFICANT CHANGE UP (ref 3.8–10.5)

## 2019-02-02 PROCEDURE — 99232 SBSQ HOSP IP/OBS MODERATE 35: CPT

## 2019-02-02 PROCEDURE — 93308 TTE F-UP OR LMTD: CPT | Mod: 26

## 2019-02-02 PROCEDURE — 93010 ELECTROCARDIOGRAM REPORT: CPT

## 2019-02-02 RX ORDER — ERYTHROPOIETIN 10000 [IU]/ML
8000 INJECTION, SOLUTION INTRAVENOUS; SUBCUTANEOUS
Qty: 0 | Refills: 0 | COMMUNITY
Start: 2019-02-02

## 2019-02-02 RX ORDER — HEPARIN SODIUM 5000 [USP'U]/ML
500 INJECTION INTRAVENOUS; SUBCUTANEOUS
Qty: 0 | Refills: 0 | Status: DISCONTINUED | OUTPATIENT
Start: 2019-02-02 | End: 2019-02-04

## 2019-02-02 RX ORDER — NYSTATIN CREAM 100000 [USP'U]/G
1 CREAM TOPICAL ONCE
Qty: 0 | Refills: 0 | Status: COMPLETED | OUTPATIENT
Start: 2019-02-02 | End: 2019-02-03

## 2019-02-02 RX ORDER — LIDOCAINE AND PRILOCAINE CREAM 25; 25 MG/G; MG/G
1 CREAM TOPICAL
Qty: 0 | Refills: 0 | Status: DISCONTINUED | OUTPATIENT
Start: 2019-02-02 | End: 2019-02-04

## 2019-02-02 RX ORDER — HEPARIN SODIUM 5000 [USP'U]/ML
1000 INJECTION INTRAVENOUS; SUBCUTANEOUS
Qty: 0 | Refills: 0 | Status: DISCONTINUED | OUTPATIENT
Start: 2019-02-02 | End: 2019-02-04

## 2019-02-02 RX ORDER — ERYTHROPOIETIN 10000 [IU]/ML
8000 INJECTION, SOLUTION INTRAVENOUS; SUBCUTANEOUS
Qty: 0 | Refills: 0 | Status: DISCONTINUED | OUTPATIENT
Start: 2019-02-02 | End: 2019-02-04

## 2019-02-02 RX ORDER — ASPIRIN/CALCIUM CARB/MAGNESIUM 324 MG
1 TABLET ORAL
Qty: 0 | Refills: 0 | COMMUNITY
Start: 2019-02-02

## 2019-02-02 RX ORDER — ASPIRIN/CALCIUM CARB/MAGNESIUM 324 MG
1 TABLET ORAL
Qty: 14 | Refills: 0 | OUTPATIENT
Start: 2019-02-02 | End: 2019-02-15

## 2019-02-02 RX ORDER — HYDRALAZINE HCL 50 MG
1 TABLET ORAL
Qty: 0 | Refills: 0 | COMMUNITY
Start: 2019-02-02

## 2019-02-02 RX ORDER — ATORVASTATIN CALCIUM 80 MG/1
1 TABLET, FILM COATED ORAL
Qty: 0 | Refills: 0 | COMMUNITY
Start: 2019-02-02

## 2019-02-02 RX ORDER — ATORVASTATIN CALCIUM 80 MG/1
1 TABLET, FILM COATED ORAL
Qty: 14 | Refills: 0 | OUTPATIENT
Start: 2019-02-02 | End: 2019-02-15

## 2019-02-02 RX ADMIN — SERTRALINE 100 MILLIGRAM(S): 25 TABLET, FILM COATED ORAL at 19:15

## 2019-02-02 RX ADMIN — Medication 325 MILLIGRAM(S): at 19:15

## 2019-02-02 RX ADMIN — ERYTHROPOIETIN 8000 UNIT(S): 10000 INJECTION, SOLUTION INTRAVENOUS; SUBCUTANEOUS at 18:33

## 2019-02-02 RX ADMIN — Medication 1 GRAM(S): at 23:39

## 2019-02-02 RX ADMIN — HEPARIN SODIUM 500 UNIT(S): 5000 INJECTION INTRAVENOUS; SUBCUTANEOUS at 18:02

## 2019-02-02 RX ADMIN — Medication 100 MILLIGRAM(S): at 05:33

## 2019-02-02 RX ADMIN — ATORVASTATIN CALCIUM 40 MILLIGRAM(S): 80 TABLET, FILM COATED ORAL at 20:48

## 2019-02-02 RX ADMIN — HEPARIN SODIUM 1000 UNIT(S): 5000 INJECTION INTRAVENOUS; SUBCUTANEOUS at 15:40

## 2019-02-02 RX ADMIN — Medication 1 GRAM(S): at 05:33

## 2019-02-02 RX ADMIN — Medication 1 GRAM(S): at 19:14

## 2019-02-02 RX ADMIN — HEPARIN SODIUM 500 UNIT(S): 5000 INJECTION INTRAVENOUS; SUBCUTANEOUS at 16:28

## 2019-02-02 RX ADMIN — Medication 0.1 MILLIGRAM(S): at 19:14

## 2019-02-02 RX ADMIN — PANTOPRAZOLE SODIUM 40 MILLIGRAM(S): 20 TABLET, DELAYED RELEASE ORAL at 05:33

## 2019-02-02 NOTE — SWALLOW BEDSIDE ASSESSMENT ADULT - SWALLOW EVAL: DIAGNOSIS
1) The pt demonstrates functional Oropharyngeal Swallowing for age without overt Dysphagia or aspiration signs.   2) The pt was disheveled in appearance. He was alert and interactive. The pt was able to verbalize during communicative probes and in conversation without evidence of an overt primary motor speech or primary linguistic pathology. He was able to effectively verbalize his needs and is reportedly at communicative baseline.

## 2019-02-02 NOTE — PHYSICAL THERAPY INITIAL EVALUATION ADULT - GAIT DEVIATIONS NOTED, PT EVAL
decreased step length/dec step ht, no incidence of LEs buckling -pt reports this can be unpredictable but LEs get "shakey" prior

## 2019-02-02 NOTE — DISCHARGE NOTE ADULT - CARE PLAN
Principal Discharge DX:	Facial weakness  Goal:	Healthy patient  Assessment and plan of treatment:	- symptoms likely due to transient ischemic attack ( which is a brief loss of blood flow to the brain)  - resolved  - imaging: negative for acute findings, just chronic ischemic changes  - continue aspirin and atorvastatin  - continue BP medications  - follow up with your neurologist in 2-3 days  - If you experience weakness, facial droop, slurred speech go the emergency room  Secondary Diagnosis:	Kidney disease  Assessment and plan of treatment:	ESRD  - continue HD on MWF  - follow up with your nephrologist within 1 week  Secondary Diagnosis:	HTN (hypertension)  Assessment and plan of treatment:	HTN  - continue hydralazine  - continue clonidine  - continue nifedipine  - Follow up with your outpatient providers in 2-3 days  Secondary Diagnosis:	Irregular heartbeat  Assessment and plan of treatment:	Hx "irregular" heart rhythm  - monitoring on telemetry unit revealed:  - Follow up with your outpatient providers in 2-3 days  Secondary Diagnosis:	Anemia  Assessment and plan of treatment:	Normocytic anemia  - Likely due to CKD vs. iron def anemia vs anemia of chronic disease  - recently hospitalized for melena; s/p EGD which revealed "nonbleeding ulcer  - FOBT_____  - continue procrit during dialysis  - continue sucralfate  - continue protonix  - Follow up with your primary care doctor regularly to monitor your blood levels Principal Discharge DX:	Facial weakness  Goal:	No signs of stroke  Assessment and plan of treatment:	- symptoms likely due to transient ischemic attack ( which is a brief loss of blood flow to the brain)  - resolved  - imaging: negative for acute findings, just chronic ischemic changes  - continue aspirin and atorvastatin  - continue BP medications  - follow up with your neurologist in 2-3 days  - If you experience weakness, facial droop, slurred speech go the emergency room  Secondary Diagnosis:	Kidney disease  Goal:	Continue dialysis  Assessment and plan of treatment:	ESRD  - continue HD on MWF  - follow up with your nephrologist within 1 week  Secondary Diagnosis:	HTN (hypertension)  Goal:	Good Blood pressure control  Assessment and plan of treatment:	HTN  - continue hydralazine  - continue clonidine  - continue nifedipine  - Follow up with your outpatient providers in 2-3 days  Secondary Diagnosis:	Irregular heartbeat  Goal:	Monitor with loop recorder  Assessment and plan of treatment:	Hx "irregular" heart rhythm  - monitoring on telemetry unit revealed: premature ventricular contractions  - You will discharged with an implantable loop recorder. This will detect any abnormal heart rhythms.  - Follow up with your cardiologist  Secondary Diagnosis:	Anemia  Goal:	No drop in hemoglobin  Assessment and plan of treatment:	Normocytic anemia  - Likely due to CKD vs. iron def anemia vs anemia of chronic disease  - recently hospitalized for melena; s/p EGD which revealed "nonbleeding ulcer  - FOBT negative  - continue procrit during dialysis  - continue sucralfate  - continue protonix  - Follow up with your primary care doctor and gastroenterologists regularly Principal Discharge DX:	Facial weakness  Goal:	No signs of stroke  Assessment and plan of treatment:	- symptoms likely due to transient ischemic attack ( which is a brief loss of blood flow to the brain)  - resolved  - imaging: negative for acute findings, just chronic ischemic changes  - continue aspirin and atorvastatin  - continue BP medications  - follow up with your neurologist in 2-3 days  - If you experience weakness, facial droop, slurred speech go the emergency room  Secondary Diagnosis:	Kidney disease  Goal:	Continue dialysis  Assessment and plan of treatment:	ESRD  - continue HD on MWF  - follow up with your nephrologist within 1 week  Secondary Diagnosis:	HTN (hypertension)  Goal:	Good Blood pressure control  Assessment and plan of treatment:	HTN  - continue hydralazine  - continue clonidine  - continue nifedipine  - Follow up with your outpatient providers in 2-3 days  Secondary Diagnosis:	Irregular heartbeat  Goal:	Monitor with loop recorder  Assessment and plan of treatment:	Hx "irregular" heart rhythm  - monitoring on telemetry unit revealed: premature ventricular contractions  - You will be discharged with an implantable loop recorder. This will detect any abnormal heart rhythms.  - Follow up with your cardiologist  Secondary Diagnosis:	Anemia  Goal:	No drop in hemoglobin  Assessment and plan of treatment:	Normocytic anemia  - Likely due to CKD vs. iron def anemia vs anemia of chronic disease  - recently hospitalized for melena; s/p EGD which revealed "nonbleeding ulcer  - FOBT negative  - continue procrit during dialysis  - continue sucralfate  - continue protonix  - Follow up with your primary care doctor and gastroenterologists regularly Principal Discharge DX:	Facial weakness  Goal:	No signs of stroke  Assessment and plan of treatment:	- symptoms likely due to old cva vs cardiac event   - resolved  - imaging: negative for acute findings, just chronic ischemic changes  - continue aspirin and atorvastatin  - continue BP medications  - follow up with your neurologist in 2-3 days  - If you experience weakness, facial droop, slurred speech go the emergency room  Secondary Diagnosis:	Kidney disease  Goal:	Continue dialysis  Assessment and plan of treatment:	ESRD  - continue HD on MWF  - follow up with your nephrologist within 1 week  Secondary Diagnosis:	HTN (hypertension)  Goal:	Good Blood pressure control  Assessment and plan of treatment:	HTN  - continue hydralazine  - continue clonidine  - continue nifedipine  - Follow up with your outpatient providers in 2-3 days  Secondary Diagnosis:	Irregular heartbeat  Goal:	Monitor with loop recorder  Assessment and plan of treatment:	Hx "irregular" heart rhythm  - monitoring on telemetry unit revealed: premature ventricular contractions  - You will be discharged with an implantable loop recorder. This will detect any abnormal heart rhythms.  - Follow up with your cardiologist  Secondary Diagnosis:	Anemia  Goal:	No drop in hemoglobin  Assessment and plan of treatment:	Normocytic anemia  - Likely due to CKD vs. iron def anemia vs anemia of chronic disease  - recently hospitalized for melena; s/p EGD which revealed "nonbleeding ulcer  - FOBT negative  - continue procrit during dialysis  - continue sucralfate  - continue protonix  - Follow up with your primary care doctor and gastroenterologists regularly

## 2019-02-02 NOTE — DISCHARGE NOTE ADULT - PLAN OF CARE
Healthy patient - symptoms likely due to transient ischemic attack ( which is a brief loss of blood flow to the brain)  - resolved  - imaging: negative for acute findings, just chronic ischemic changes  - continue aspirin and atorvastatin  - continue BP medications  - follow up with your neurologist in 2-3 days  - If you experience weakness, facial droop, slurred speech go the emergency room ESRD  - continue HD on MWF  - follow up with your nephrologist within 1 week HTN  - continue hydralazine  - continue clonidine  - continue nifedipine  - Follow up with your outpatient providers in 2-3 days Hx "irregular" heart rhythm  - monitoring on telemetry unit revealed:  - Follow up with your outpatient providers in 2-3 days Normocytic anemia  - Likely due to CKD vs. iron def anemia vs anemia of chronic disease  - recently hospitalized for melena; s/p EGD which revealed "nonbleeding ulcer  - FOBT_____  - continue procrit during dialysis  - continue sucralfate  - continue protonix  - Follow up with your primary care doctor regularly to monitor your blood levels No signs of stroke Continue dialysis Good Blood pressure control Monitor with loop recorder Hx "irregular" heart rhythm  - monitoring on telemetry unit revealed: premature ventricular contractions  - You will discharged with an implantable loop recorder. This will detect any abnormal heart rhythms.  - Follow up with your cardiologist No drop in hemoglobin Normocytic anemia  - Likely due to CKD vs. iron def anemia vs anemia of chronic disease  - recently hospitalized for melena; s/p EGD which revealed "nonbleeding ulcer  - FOBT negative  - continue procrit during dialysis  - continue sucralfate  - continue protonix  - Follow up with your primary care doctor and gastroenterologists regularly Hx "irregular" heart rhythm  - monitoring on telemetry unit revealed: premature ventricular contractions  - You will be discharged with an implantable loop recorder. This will detect any abnormal heart rhythms.  - Follow up with your cardiologist - symptoms likely due to old cva vs cardiac event   - resolved  - imaging: negative for acute findings, just chronic ischemic changes  - continue aspirin and atorvastatin  - continue BP medications  - follow up with your neurologist in 2-3 days  - If you experience weakness, facial droop, slurred speech go the emergency room

## 2019-02-02 NOTE — CONSULT NOTE ADULT - SUBJECTIVE AND OBJECTIVE BOX
COVERING FOR DR Flynn    63M.  admitted 02/01/2019.  presented from his psychotherapist's office in [place] to ED due to a L sided facial droop.  this was noted by the therapist (not the patient, as he purportedly never looks at himself in the mirror).  EMS was called.  Code stroke was not called at triage, as it was unknown onset without symptoms.   MRI/MRA revealed old lacunar infarcts , no acute findings and no hemodynamic stenosis   Renal eval called today for hx of ESRD on HD on HD TTS - due today   PMHX: ESRD on HD since march 2018 , HTN,    regualr unit is [facility]     incidentally, patient was admitted to [facility] 3 weeks prior to current admission due to "black stool."  patient underwent an EGD which found an "old, nonbleeding ucler" according to patient.  additionally, we was informed he had an, "irregular heart beat."  cardiology w/u at that time was unrevealing and were unable to document a reoccurrence.      ROS:  (-) HA, visual changes, dysarthria, dysmetria, motor weakness or ataxia, recurrent melena or hematochezia, suic    today pt has no complaints  AAOX 3  denies any diff moveing ext, denies numbness,   no sob or cp   states he urinates and takes minimal fluid off at HD due to residual UOP   had recent AVG revision done     PAST MEDICAL & SURGICAL HISTORY:  ESRD on HD since march 2018   PVD (peripheral vascular disease)  HTN (hypertension)  No significant past surgical history    Home Medications:  ARIPiprazole 2 mg oral tablet: 1 tab(s) orally once a day  Pt reports he stopped taking this per provider&#x27;s instructions, 2-3 weeks ago, to assess if this was exacerbating his ulcer (01 Feb 2019 16:33)  calcium acetate 667 mg oral tablet: 1 tab(s) orally 3 times a day  Pt ran out and not adherent (01 Feb 2019 16:33)  Centrum oral tablet: 1 tab(s) orally once a day (01 Feb 2019 16:33)  cloNIDine 0.1 mg oral tablet: 1 tab(s) orally 2 times a day (01 Feb 2019 16:33)  magnesium oxide 400 mg (241.3 mg elemental magnesium) oral tablet: 1 tab(s) orally once a day (01 Feb 2019 16:33)  mirtazapine 7.5 mg oral tablet: 1 tab(s) orally once a day (at bedtime), As Needed (01 Feb 2019 16:33)  NIFEdipine 90 mg oral tablet, extended release: 1 tab(s) orally once a day (01 Feb 2019 16:33)  pantoprazole 40 mg oral delayed release tablet: 1 tab(s) orally once a day    Filled by pharmacy 1/26/19 but pt unaware of med (01 Feb 2019 16:33)  Yeni-Donna oral tablet: 1 tab(s) orally once a day    Pt not adherent with this med (01 Feb 2019 16:33)  sertraline 50 mg oral tablet: 2 tab(s) orally once a day (01 Feb 2019 16:33)  sucralfate 1 g oral tablet: 1 tab(s) orally 4 times a day (before meals and at bedtime)    Pt reports he often only has one meal, thus only takes this twice daily (01 Feb 2019 16:33)    MEDICATIONS  (STANDING):  aspirin 325 milliGRAM(s) Oral daily  atorvastatin 40 milliGRAM(s) Oral at bedtime  cloNIDine 0.1 milliGRAM(s) Oral two times a day  docusate sodium 100 milliGRAM(s) Oral three times a day  hydrALAZINE 100 milliGRAM(s) Oral two times a day  NIFEdipine XL 90 milliGRAM(s) Oral daily  pantoprazole    Tablet 40 milliGRAM(s) Oral before breakfast  sertraline 100 milliGRAM(s) Oral daily  sucralfate 1 Gram(s) Oral four times a day      Allergies    doxazosin (Hives)  Doxazosin Mesylate (Unknown)    Intolerances        SOCIAL HISTORY:  Denies ETOh,Smoking,     FAMILY HISTORY:      REVIEW OF SYSTEMS:    CONSTITUTIONAL: No weakness, fevers or chills  EYES/ENT: No visual changes;  No vertigo or throat pain   NECK: No pain or stiffness  RESPIRATORY: No cough, wheezing, hemoptysis; No shortness of breath  CARDIOVASCULAR: No chest pain or palpitations  GASTROINTESTINAL: No abdominal or epigastric pain. No nausea, vomiting, or hematemesis; No diarrhea or constipation. No melena or hematochezia.  GENITOURINARY: No dysuria, frequency or hematuria  NEUROLOGICAL: No numbness or weakness  SKIN: No itching, burning, rashes, or lesions   All other review of systems is negative unless indicated above.    VITAL:  T(C): , Max: 36.9 (02-01-19 @ 19:31)  T(F): , Max: 98.4 (02-01-19 @ 19:31)  HR: 65 (02-02-19 @ 05:45)  BP: 107/50 (02-02-19 @ 05:45)  BP(mean): --  RR: 16 (02-02-19 @ 05:45)  SpO2: 97% (02-02-19 @ 05:45)  Wt(kg): --    I and O's:    Height (cm): 177.8 (02-01 @ 13:00)  Weight (kg): 127 (02-01 @ 13:00)  BMI (kg/m2): 40.2 (02-01 @ 13:00)  BSA (m2): 2.41 (02-01 @ 13:00)    PHYSICAL EXAM:    Constitutional: NAD  HEENT:  EOMI,  MMM  Neck: No LAD, No JVD  Respiratory: CTAB  Cardiovascular: S1 and S2  Gastrointestinal: BS+, soft, NT/ND  Extremities: No peripheral edema  Neurological: A/O x 3, vague left facial loss of nasolabila fold but full rom of face with smiling   moving all ext   : No Hair  Skin: No rashes  Access:RUE AVG = thrill and bruit     LABS:    140    |  106    |  49     ----------------------------<  94        02 Feb 2019 06:37  4.1     |  23     |  9.14     140    |  104    |  35     ----------------------------<  82        01 Feb 2019 12:54  3.8     |  28     |  7.47     Ca    8.4        02 Feb 2019 06:37  Ca    8.6        01 Feb 2019 12:54                          10.1   9.80  )-----------( 263      ( 02 Feb 2019 06:37 )             30.9                         11.3   11.21 )-----------( 301      ( 01 Feb 2019 12:54 )             33.5         Urine Studies:      RADIOLOGY & ADDITIONAL STUDIES:

## 2019-02-02 NOTE — SWALLOW BEDSIDE ASSESSMENT ADULT - SWALLOW EVAL: RECOMMENDED DIET
SUGGEST A REGULAR TEXTURE DIET WITH THIN LIQUID CONSISTENCIES AS HE TOLERATES THESE FOOD CONSISTENCIES FROM AN OROPHARYNGEAL SWALLOWING PERSPECTIVE ON CLINICAL EXAM.

## 2019-02-02 NOTE — DISCHARGE NOTE ADULT - MEDICATION SUMMARY - MEDICATIONS TO TAKE
I will START or STAY ON the medications listed below when I get home from the hospital:    aspirin 325 mg oral tablet  -- 1 tab(s) by mouth once a day  -- Indication: For TIA    cloNIDine 0.1 mg oral tablet  -- 1 tab(s) by mouth 2 times a day  -- Indication: For HTN (hypertension)    sertraline 50 mg oral tablet  -- 2 tab(s) by mouth once a day  -- Indication: For Depression    mirtazapine 7.5 mg oral tablet  -- 1 tab(s) by mouth once a day (at bedtime), As Needed  -- Indication: For Depression    atorvastatin 40 mg oral tablet  -- 1 tab(s) by mouth once a day (at bedtime)  -- Indication: For TIA    ARIPiprazole 2 mg oral tablet  -- 1 tab(s) by mouth once a day    Pt reports he stopped taking this per provider's instructions, 2-3 weeks ago, to assess if this was exacerbating his ulcer  -- Indication: For Depression    NIFEdipine 90 mg oral tablet, extended release  -- 1 tab(s) by mouth once a day  -- Indication: For HTN (hypertension)    epoetin rajwinder  -- 8000 unit(s) intravenous Tuesday, Thursday, Saturday  -- Indication: For Anemia    magnesium oxide 400 mg (241.3 mg elemental magnesium) oral tablet  -- 1 tab(s) by mouth once a day  -- Indication: For Prophylactic    sucralfate 1 g oral tablet  -- 1 tab(s) by mouth 4 times a day (before meals and at bedtime)    Pt reports he often only has one meal, thus only takes this twice daily  -- Indication: For Gerd    calcium acetate 667 mg oral tablet  -- 1 tab(s) by mouth 3 times a day    Pt ran out and not adherent  -- Indication: For Prophylactic    pantoprazole 40 mg oral delayed release tablet  -- 1 tab(s) by mouth once a day    Filled by pharmacy 1/26/19 but pt unaware of med  -- Indication: For Gerd    hydrALAZINE 100 mg oral tablet  -- 1 tab(s) by mouth 2 times a day  -- Indication: For HTN (hypertension)    Yeni-Donna oral tablet  -- 1 tab(s) by mouth once a day    Pt not adherent with this med  -- Indication: For Prophylactic    Centrum oral tablet  -- 1 tab(s) by mouth once a day  -- Indication: For Prophylactic I will START or STAY ON the medications listed below when I get home from the hospital:    aspirin 325 mg oral tablet  -- 1 tab(s) by mouth once a day  -- Indication: For cad    cloNIDine 0.1 mg oral tablet  -- 1 tab(s) by mouth 2 times a day  -- Indication: For HTN (hypertension)    sertraline 50 mg oral tablet  -- 2 tab(s) by mouth once a day  -- Indication: For Depression    mirtazapine 7.5 mg oral tablet  -- 1 tab(s) by mouth once a day (at bedtime), As Needed  -- Indication: For Depression    atorvastatin 40 mg oral tablet  -- 1 tab(s) by mouth once a day (at bedtime)  -- Indication: For Hld    ARIPiprazole 2 mg oral tablet  -- 1 tab(s) by mouth once a day    Pt reports he stopped taking this per provider's instructions, 2-3 weeks ago, to assess if this was exacerbating his ulcer  -- Indication: For Depression    NIFEdipine 90 mg oral tablet, extended release  -- 1 tab(s) by mouth once a day  -- Indication: For HTN (hypertension)    epoetin rajwinder  -- 8000 unit(s) intravenous Tuesday, Thursday, Saturday  -- Indication: For Anemia    magnesium oxide 400 mg (241.3 mg elemental magnesium) oral tablet  -- 1 tab(s) by mouth once a day  -- Indication: For Prophylactic    sucralfate 1 g oral tablet  -- 1 tab(s) by mouth 4 times a day (before meals and at bedtime)    Pt reports he often only has one meal, thus only takes this twice daily  -- Indication: For Gerd    calcium acetate 667 mg oral tablet  -- 1 tab(s) by mouth 3 times a day    Pt ran out and not adherent  -- Indication: For Prophylactic    pantoprazole 40 mg oral delayed release tablet  -- 1 tab(s) by mouth once a day    Filled by pharmacy 1/26/19 but pt unaware of med  -- Indication: For Gerd    hydrALAZINE 100 mg oral tablet  -- 1 tab(s) by mouth 2 times a day  -- Indication: For HTN (hypertension)    Yeni-Donna oral tablet  -- 1 tab(s) by mouth once a day    Pt not adherent with this med  -- Indication: For Prophylactic    Centrum oral tablet  -- 1 tab(s) by mouth once a day  -- Indication: For Prophylactic

## 2019-02-02 NOTE — PHYSICAL THERAPY INITIAL EVALUATION ADULT - PERTINENT HX OF CURRENT PROBLEM, REHAB EVAL
presented from his psychotherapist's office in [place] to ED due to a L sided facial droop-this was noted by the therapist. CTH/MRI (-) acute infarct or ICH, +Multiple chronic lacunar infarcts in B BG. (also w/ recent hospitalization due to melena-at which time told he had irreg HB;  h/o severe depression)

## 2019-02-02 NOTE — SWALLOW BEDSIDE ASSESSMENT ADULT - COMMENTS
The patient was admitted to  with a left  facial droop. Anemia noted while in hospital. Work up in progress. This profile is superimposed upon a history of a recent GIB, PVD, depression, HTN and ESRD. The patient was admitted to  with a left facial droop. Anemia noted while in hospital. Work up in progress. This profile is superimposed upon a history of a recent GIB, PVD, depression, HTN and ESRD.

## 2019-02-02 NOTE — PHYSICAL THERAPY INITIAL EVALUATION ADULT - ADDITIONAL COMMENTS
pt lives in ground floor apt, no MONICA, has shower chair, reports occurrences of LEs "going out" "and when legs go out, arms go out" but has deferred use of AD due to wants to know why before he'll accept device. pt reports has seen neurol but unknown cause of LEs "going out". notes h/o HNP but per pt "not pinching anything". denies recent fall.

## 2019-02-02 NOTE — PHYSICAL THERAPY INITIAL EVALUATION ADULT - DISCHARGE DISPOSITION, PT EVAL
TANIA vs home w/ homecare? TBD pending further functional progress/home w/ home PT/rehabilitation facility

## 2019-02-02 NOTE — DISCHARGE NOTE ADULT - PATIENT PORTAL LINK FT
You can access the [facility], offered by [facility], by registering with the following website: [email or web address]

## 2019-02-02 NOTE — DISCHARGE NOTE ADULT - HOSPITAL COURSE
63M.  admitted 02/01/2019.  presented from his psychotherapist's office in [place] to ED due to a L sided facial droop.  this was noted by the therapist (not the patient, as he purportedly never looks at himself in the mirror).  EMS was called.  Code stroke was not called at triage, has it was unknown onset of symptom.    incidentally, patient was admitted to [facility] 3 weeks prior to current admission due to "black stool."  patient underwent an EGD which found an "old, nonbleeding ucler" according to patient.  additionally, we was informed he had an, "irregular heart beat."  cardiology w/u at that time was unrevealing and were unable to document a reoccurrence.      2/2: Patient evaluated at bedside. No acute complaints. AOx3. Denies any new weakness, sensory deficits, chest pain, palpitations, SOB. 63M.  admitted 02/01/2019.  presented from his psychotherapist's office in [place] to ED due to a L sided facial droop.  this was noted by the therapist (not the patient, as he purportedly never looks at himself in the mirror).  EMS was called.  Code stroke was not called at triage, has it was unknown onset of symptom.    incidentally, patient was admitted to [facility] 3 weeks prior to current admission due to "black stool."  patient underwent an EGD which found an "old, nonbleeding ucler" according to patient.  additionally, we was informed he had an, "irregular heart beat."  cardiology w/u at that time was unrevealing and were unable to document a reoccurrence. Pt admitted for r/o CVA vs. TIA due to facial droop noted by psychotherapist. On admission facial droop not apparent. Neuro exam non-focal. CT and MRI head and neck negative for acute bleed or thrombosis, just chronic changes. On day of discharge patient is afebrile and hemodynamically stable.    2/2: Patient evaluated at bedside. No acute complaints. AOx3. Denies any new weakness, sensory deficits, chest pain, palpitations, SOB. 63M.  admitted 02/01/2019.  presented from his psychotherapist's office in [place] to ED due to a L sided facial droop.  this was noted by the therapist (not the patient, as he purportedly never looks at himself in the mirror).  EMS was called.  Code stroke was not called at triage, has it was unknown onset of symptom.    incidentally, patient was admitted to [facility] 3 weeks prior to current admission due to "black stool."  patient underwent an EGD which found an "old, nonbleeding ucler" according to patient.  additionally, we was informed he had an, "irregular heart beat."  cardiology w/u at that time was unrevealing and were unable to document a reoccurrence. Pt admitted for r/o CVA vs. TIA due to facial droop noted by psychotherapist. On admission facial droop not apparent. Neuro exam non-focal. CT and MRI head and neck negative for acute bleed or thrombosis, just chronic changes. Patient will be discharged with loop recorder due to history of "irregular heart rhythm". On day of discharge patient is afebrile and hemodynamically stable.    2/2: Patient evaluated at bedside. No acute complaints. AOx3. Denies any new weakness, sensory deficits, chest pain, palpitations, SOB. 63M.  admitted 02/01/2019.  presented from his psychotherapist's office in [place] to ED due to a L sided facial droop.  this was noted by the therapist (not the patient, as he purportedly never looks at himself in the mirror).  EMS was called.  Code stroke was not called at triage, has it was unknown onset of symptom.    incidentally, patient was admitted to [facility] 3 weeks prior to current admission due to "black stool."  patient underwent an EGD which found an "old, nonbleeding ucler" according to patient.  additionally, we was informed he had an, "irregular heart beat."  cardiology w/u at that time was unrevealing and were unable to document a reoccurrence. Pt admitted for r/o CVA vs. TIA due to facial droop noted by psychotherapist. On admission facial droop not apparent. Neuro exam non-focal. CT and MRI head and neck negative for acute bleed or thrombosis, just chronic changes. Patient will be discharged with loop recorder due to history of "irregular heart rhythm". On day of discharge patient is afebrile and hemodynamically stable.    2/2: Patient evaluated at bedside. No acute complaints. AOx3. Denies any new weakness, sensory deficits, chest pain, palpitations, SOB.    pgy3 d/c addendum  per neurology note patient symptoms lasting longer then criteria for tia (Dr Wolf); at this point patient etiology resolved and may probably be secondary to old cva vs cardiac event

## 2019-02-02 NOTE — DISCHARGE NOTE ADULT - CARE PROVIDER_API CALL
Kym Wolf)  Clinical Neurophysiology; EEGEpilepsy; Neurology; Sleep Medicine  [address], [address]  [place] [address]  Phone: [phone]  Fax: [phone]    Rock Trujillo)  Cardiovascular Disease; Internal Medicine  [address], [address]  [place] [address]  Phone: [phone]  Fax: [phone]

## 2019-02-02 NOTE — DISCHARGE NOTE ADULT - NS AS DC STROKE DX YN
yes no/per neurology note patient symptoms lasting longer then criteria for tia (Dr Wolf); at this point patient etiology resolved and may probably be secondary to old cva vs cardiac event

## 2019-02-02 NOTE — PROGRESS NOTE ADULT - ASSESSMENT
63 male with PMHx of ESRD on dialysis (TTS), HTN and depression who was sent in due for evaluation of facial droop.     TIA   - resolved  - imaging: negative for acute findings, just chronic ischemic changes  - continue aspirin and atorvastatin  - neurology consult appreciated    Hx "irregular" heart rhythm  - monitor on tele  - EP consulted  - cardio consulted    Normocytic anemia  - Likely due to CKD vs. iron def anemia vs anemia of chronic disease  - recently hospitalized for melena; s/p EGD which revealed "nonbleeding ulcer"  - F/u FOBT  - continue procrit during dialysis  - continue sucralfate  - continue protonix  - will monitor    ESRD  - continue HD on MWF  - nephrology consult appreciated    HTN  - stable  - continue hydralazine  - continue clonidine  - continue nifedipine    Depression  - continue sertraline  - continue mirtazapine    DVT prophylaxis  - SCD's  - ambulation as tolerated 63 male with PMHx of ESRD on dialysis (TTS), HTN and depression who was sent in due for evaluation of facial droop.     TIA   - symptoms likely 2/2 TIA  - imaging: negative for acute findings, just chronic ischemic changes  - continue aspirin and atorvastatin  - continue BP meds  - neurology consult appreciated    Hx "irregular" heart rhythm  - monitor on tele  - EP consulted  - cardio consulted    Normocytic anemia  - Likely due to CKD vs. iron def anemia vs. anemia of chronic disease  - recently hospitalized for melena; s/p EGD which revealed "nonbleeding ulcer"  - F/u FOBT  - continue procrit during dialysis  - continue sucralfate  - continue protonix  - will monitor    ESRD  - continue HD on MWF  - nephrology consult appreciated    HTN  - stable  - continue hydralazine  - continue clonidine  - continue nifedipine    Depression  - continue sertraline  - continue mirtazapine    DVT prophylaxis  - SCD's  - ambulation as tolerated 63 male with PMHx of ESRD on dialysis (TTS), HTN and depression who was sent in due for evaluation of facial droop.     TIA   - symptoms likely 2/2 TIA vs. old stroke  - imaging: negative for acute findings, just chronic ischemic changes, chronic lacunar infarct  - continue aspirin and atorvastatin  - continue BP meds  - neurology consult appreciated    Hx "irregular" heart rhythm  - monitor on tele  - EP consulted  - cardio consulted    Normocytic anemia  - Likely due to CKD vs. iron def anemia vs. anemia of chronic disease  - recently hospitalized for melena; s/p EGD which revealed "nonbleeding ulcer"  - F/u FOBT  - continue procrit during dialysis  - continue sucralfate  - continue protonix  - will monitor    ESRD  - continue HD on MWF  - nephrology consult appreciated    HTN  - stable  - continue hydralazine  - continue clonidine  - continue nifedipine    Depression  - continue sertraline  - continue mirtazapine    DVT prophylaxis  - SCD's  - ambulation as tolerated

## 2019-02-02 NOTE — CONSULT NOTE ADULT - ASSESSMENT
62 yo male with hx of HTN, Depression and ESRD on HD TTS at [facility] presents from psychotherapy office with left facial droop asymptomatic and negative acute neuro workup so far    Left facial droop    - Neuro fup       HTN - borderline this AM   - monitor with HD today   - hold BP meds prior to HD  - no fluid removal at HD as pt appears eeu/hypovolemic    ESRD on HD TTS  - HD today as per orders  - epo at HD    d/w Medical resident Dr ferreira    Thank you for the courtesy of this consult. We will follow this patient with you.   Management is subject to change if new information becomes available or patient condition changes.

## 2019-02-02 NOTE — DISCHARGE NOTE ADULT - MEDICATION SUMMARY - MEDICATIONS TO STOP TAKING
I will STOP taking the medications listed below when I get home from the hospital:    amoxicillin 500 mg oral tablet  -- 1 tab(s) by mouth 2 times a day  -- Finish all this medication unless otherwise directed by prescriber.

## 2019-02-02 NOTE — SWALLOW BEDSIDE ASSESSMENT ADULT - SLP GENERAL OBSERVATIONS
The pt was disheveled in appearance. He was alert and interactive. The pt was able to verbalize during communicative probes and in conversation without evidence of an overt primary motor speech or primary linguistic pathology. He was able to effectively verbalize his needs and is reportedly at communicative baseline. Note that pt denied Dysphagia when asked.

## 2019-02-03 LAB
ANION GAP SERPL CALC-SCNC: 8 MMOL/L — SIGNIFICANT CHANGE UP (ref 5–17)
BUN SERPL-MCNC: 30 MG/DL — HIGH (ref 7–23)
CALCIUM SERPL-MCNC: 8.7 MG/DL — SIGNIFICANT CHANGE UP (ref 8.5–10.1)
CHLORIDE SERPL-SCNC: 102 MMOL/L — SIGNIFICANT CHANGE UP (ref 96–108)
CO2 SERPL-SCNC: 30 MMOL/L — SIGNIFICANT CHANGE UP (ref 22–31)
CREAT SERPL-MCNC: 6.23 MG/DL — HIGH (ref 0.5–1.3)
GLUCOSE SERPL-MCNC: 85 MG/DL — SIGNIFICANT CHANGE UP (ref 70–99)
HAV IGM SER-ACNC: SIGNIFICANT CHANGE UP
HAV IGM SER-ACNC: SIGNIFICANT CHANGE UP
HBV CORE IGM SER-ACNC: SIGNIFICANT CHANGE UP
HBV CORE IGM SER-ACNC: SIGNIFICANT CHANGE UP
HBV SURFACE AG SER-ACNC: SIGNIFICANT CHANGE UP
HBV SURFACE AG SER-ACNC: SIGNIFICANT CHANGE UP
HCT VFR BLD CALC: 32.5 % — LOW (ref 39–50)
HCV AB S/CO SERPL IA: 0.14 S/CO — SIGNIFICANT CHANGE UP
HCV AB S/CO SERPL IA: 0.15 S/CO — SIGNIFICANT CHANGE UP
HCV AB SERPL-IMP: SIGNIFICANT CHANGE UP
HCV AB SERPL-IMP: SIGNIFICANT CHANGE UP
HGB BLD-MCNC: 10.7 G/DL — LOW (ref 13–17)
MCHC RBC-ENTMCNC: 32.1 PG — SIGNIFICANT CHANGE UP (ref 27–34)
MCHC RBC-ENTMCNC: 32.9 GM/DL — SIGNIFICANT CHANGE UP (ref 32–36)
MCV RBC AUTO: 97.6 FL — SIGNIFICANT CHANGE UP (ref 80–100)
NRBC # BLD: 0 /100 WBCS — SIGNIFICANT CHANGE UP (ref 0–0)
PLATELET # BLD AUTO: 262 K/UL — SIGNIFICANT CHANGE UP (ref 150–400)
POTASSIUM SERPL-MCNC: 3.8 MMOL/L — SIGNIFICANT CHANGE UP (ref 3.5–5.3)
POTASSIUM SERPL-SCNC: 3.8 MMOL/L — SIGNIFICANT CHANGE UP (ref 3.5–5.3)
RBC # BLD: 3.33 M/UL — LOW (ref 4.2–5.8)
RBC # FLD: 13.2 % — SIGNIFICANT CHANGE UP (ref 10.3–14.5)
SODIUM SERPL-SCNC: 140 MMOL/L — SIGNIFICANT CHANGE UP (ref 135–145)
WBC # BLD: 8.05 K/UL — SIGNIFICANT CHANGE UP (ref 3.8–10.5)
WBC # FLD AUTO: 8.05 K/UL — SIGNIFICANT CHANGE UP (ref 3.8–10.5)

## 2019-02-03 PROCEDURE — 99232 SBSQ HOSP IP/OBS MODERATE 35: CPT

## 2019-02-03 RX ORDER — HEPARIN SODIUM 5000 [USP'U]/ML
5000 INJECTION INTRAVENOUS; SUBCUTANEOUS EVERY 8 HOURS
Qty: 0 | Refills: 0 | Status: DISCONTINUED | OUTPATIENT
Start: 2019-02-03 | End: 2019-02-04

## 2019-02-03 RX ADMIN — Medication 1 GRAM(S): at 12:28

## 2019-02-03 RX ADMIN — Medication 0.1 MILLIGRAM(S): at 06:00

## 2019-02-03 RX ADMIN — Medication 1 GRAM(S): at 06:00

## 2019-02-03 RX ADMIN — Medication 90 MILLIGRAM(S): at 05:59

## 2019-02-03 RX ADMIN — SERTRALINE 100 MILLIGRAM(S): 25 TABLET, FILM COATED ORAL at 12:28

## 2019-02-03 RX ADMIN — Medication 1 GRAM(S): at 17:55

## 2019-02-03 RX ADMIN — Medication 0.1 MILLIGRAM(S): at 17:56

## 2019-02-03 RX ADMIN — Medication 325 MILLIGRAM(S): at 12:28

## 2019-02-03 RX ADMIN — NYSTATIN CREAM 1 APPLICATION(S): 100000 CREAM TOPICAL at 06:00

## 2019-02-03 RX ADMIN — PANTOPRAZOLE SODIUM 40 MILLIGRAM(S): 20 TABLET, DELAYED RELEASE ORAL at 05:59

## 2019-02-03 NOTE — PROGRESS NOTE ADULT - ASSESSMENT
63 male with PMHx of ESRD on dialysis (TTS), HTN and depression who was sent in due for evaluation of facial droop.     #TIA   - symptoms likely 2/2 TIA vs. old stroke  - imaging as above  - continue aspirin and atorvastatin  - neurology consult appreciated    #Hx "irregular" heart rhythm  - monitor on tele  - EP consulted- pending  - cardiology consult appreciatd     #Normocytic anemia  - Likely due to CKD vs. iron def anemia vs. anemia of chronic disease  - recently hospitalized for melena; s/p EGD which revealed "nonbleeding ulcer"  - continue procrit during dialysis  - continue sucralfate and protonix  - continue protonix  - monitor h/h  - fobt negative     #ESRD  - continue HD on MWF per nephrology  - nephrology consult appreciated    #HTN  - stable  - monitor blood pressure  - continue hydralazine, clonidine and nifedipine    #Depression  - stable   - follows w/ outpt psych  - continue sertraline and mirtazipine prn

## 2019-02-03 NOTE — PROGRESS NOTE ADULT - ASSESSMENT
63 year old man with multiple medical problems including ESRD who presented with left facial droop, time of onset is unclear.  It is not clear that this was an acute neurological event. MRI brain does not show any acute infarct and symptoms were persistent longer than the criteria for TIA.   I do not appreciate any facial weakness today. He may have fluctuating symptoms from chronic infarct.   Continue aspirin 325 mg daily.  Continue atorvastatin.  Telemetry has shown sinus rhythm, sinus bradycardia and PVCs. No atrial fibrillation noted.  Cardiology consult appreciated.

## 2019-02-03 NOTE — CONSULT NOTE ADULT - ASSESSMENT
patient sent to ER by psychotherapist for facial droop/MRI unrevealing for CVA and c/o arrhythmia-with nothing significant on telemetry-PVC's with no sustained arrhythmia  [id number]-get ECHO.  If neurology feels there is a neurological event, would then get EPS involved for loop  2-facial droop-get lyme titer; consider bells palsy in differential patient sent to ER by psychotherapist for facial droop/MRI unrevealing for CVA and c/o arrhythmia-with nothing significant on telemetry-PVC's with no sustained arrhythmia  [id number]-get ECHO.  If neurology feels there is a neurological event, would then get EPS involved for loop  2-facial droop vs asymmetry-get lyme titer; ddx bells (resolved), TIA vs normal variant   3-white matter disease-due to hypertension-control BP

## 2019-02-03 NOTE — CONSULT NOTE ADULT - REASON FOR ADMISSION
Patient is a 63y old  Male who presents with a chief complaint of left facial droop.
Patient is a 63y old  Male who presents with a chief complaint of left facial droop.

## 2019-02-03 NOTE — CONSULT NOTE ADULT - SUBJECTIVE AND OBJECTIVE BOX
CHIEF COMPLAINT: Patient is a 63y old  Male who presents with a chief complaint of Patient is a 63y old  Male who presents with a chief complaint of left facial droop. (02 Feb 2019 16:32)      HPI:  63M.  admitted 02/01/2019.  presented from his psychotherapist's office in [place] to ED due to a L sided facial droop.  this was noted by the therapist (not the patient, as he purportedly never looks at himself in the mirror).  EMS was called.  Code stroke was not called at triage, has it was unknown onset of symptom.  incidentally, patient was admitted to [facility] 3 weeks prior to current admission due to "black stool."  patient underwent an EGD which found an "old, nonbleeding ucler" according to patient.  additionally, we was informed he had an, "irregular heart beat."  cardiology w/u at that time was unrevealing and were unable to document a reoccurrence.      2/3-patient admitted for facial droop; MRI in ER-with no acute findings.  Evaluation shows slight facial droop of unknown duration.  Patient is transient with intansigence.  Has been to multiple hospitals for multiple reasons-related to living conditions.  States he feels palpitations.  Prior c/o at [facility] failed to reveal diagnosis with cardiac w/u    ROS:  (-) HA, visual changes, dysarthria, dysmetria, motor weakness or ataxia, recurrent melena or hematochezia, suicidal or homicidal ideations.    PMHx:  HTN;  ESRT-RRT TTS + RUE AVF;  depression.    PSHx:  RUE AVF;  L foot sx.    ALL:  doxazosin.    Rx:  reviewed.    SocHx:  on disability due to ESRD.  previously, worked numerous odd jobs.  denied tobacco, EtOH or illegal drugs. (01 Feb 2019 16:30)      PMHx: PAST MEDICAL & SURGICAL HISTORY:  Kidney disease: needs fistula  PVD (peripheral vascular disease)  HTN (hypertension)  No significant past surgical history        Soc Hx:       Allergies: Allergies    doxazosin (Hives)  Doxazosin Mesylate (Unknown)    Intolerances          REVIEW OF SYSTEMS:    CONSTITUTIONAL: No weakness, fevers or chills  EYES/ENT: No visual changes;  No vertigo or throat pain   NECK: No pain or stiffness  RESPIRATORY:  shortness of breath  CARDIOVASCULAR: No chest pain or palpitations  GASTROINTESTINAL: No abdominal or epigastric pain. No nausea, vomiting, or hematemesis; No diarrhea or constipation. No melena or hematochezia.  GENITOURINARY: No dysuria, frequency or hematuria  NEUROLOGICAL: No numbness or weakness  SKIN: No itching, burning, rashes, or lesions   All other review of systems is negative unless indicated above    Vital Signs Last 24 Hrs  T(C): 36.7 (03 Feb 2019 05:36), Max: 37 (02 Feb 2019 15:30)  T(F): 98 (03 Feb 2019 05:36), Max: 98.6 (02 Feb 2019 15:30)  HR: 61 (03 Feb 2019 05:36) (60 - 70)  BP: 120/59 (03 Feb 2019 05:36) (120/59 - 143/74)  BP(mean): --  RR: 16 (02 Feb 2019 20:54) (16 - 17)  SpO2: 93% (03 Feb 2019 05:36) (93% - 98%)    I&O's Summary    02 Feb 2019 07:01  -  03 Feb 2019 06:56  --------------------------------------------------------  IN: 0 mL / OUT: 300 mL / NET: -300 mL        CAPILLARY BLOOD GLUCOSE          PHYSICAL EXAM:   Constitutional: NAD, awake and alert, well-developed  HEENT: PERR, EOMI, Normal Hearing, MMM  Neck: Soft and supple, No LAD, No JVD  Respiratory: Breath sounds are clear bilaterally, No wheezing, rales or rhonchi  Cardiovascular: S1 and S2, regular rate and rhythm, no Murmurs, gallops or rubs  Gastrointestinal: Bowel Sounds present, soft, nontender, nondistended, no guarding, no rebound  Extremities: No peripheral edema  Vascular: 2+ peripheral pulses  Neurological: A/O x 3, no focal deficits  Musculoskeletal: 5/5 strength b/l upper and lower extremities  Skin: No rashes    MEDICATIONS:  MEDICATIONS  (STANDING):  aspirin 325 milliGRAM(s) Oral daily  atorvastatin 40 milliGRAM(s) Oral at bedtime  cloNIDine 0.1 milliGRAM(s) Oral two times a day  docusate sodium 100 milliGRAM(s) Oral three times a day  epoetin rajwinder Injectable 8000 Unit(s) IV Push <User Schedule>  heparin  Injectable 1000 Unit(s) Dialysis. <User Schedule>  heparin  Injectable 500 Unit(s) Dialysis. <User Schedule>  hydrALAZINE 100 milliGRAM(s) Oral two times a day  lidocaine/prilocaine Cream 1 Application(s) Topical <User Schedule>  NIFEdipine XL 90 milliGRAM(s) Oral daily  pantoprazole    Tablet 40 milliGRAM(s) Oral before breakfast  sertraline 100 milliGRAM(s) Oral daily  sucralfate 1 Gram(s) Oral four times a day      LABS: All Labs Reviewed:                        10.7   8.05  )-----------( 262      ( 03 Feb 2019 05:22 )             32.5     02-02    140  |  106  |  49<H>  ----------------------------<  94  4.1   |  23  |  9.14<H>    Ca    8.4<L>      02 Feb 2019 06:37    TPro  8.2  /  Alb  3.2<L>  /  TBili  0.4  /  DBili  x   /  AST  19  /  ALT  25  /  AlkPhos  119  02-01    PT/INR - ( 01 Feb 2019 12:54 )   PT: 12.3 sec;   INR: 1.10 ratio         PTT - ( 01 Feb 2019 12:54 )  PTT:26.3 sec  CARDIAC MARKERS ( 01 Feb 2019 12:54 )  <0.015 ng/mL / x     / x     / x     / x            Blood Culture:   lipid profile       RADIOLOGY:  < from: MR Head No Cont (02.01.19 @ 17:42) >  Ventricles and sulci are normal in size. There is no acute infarct,   hemorrhage or mass lesion. Multiple chronic lacunar infarcts in the   bilateral basal ganglia are present. Patchy foci of T2/FLAIR   hyperintensity in the periventricular white matter, compatible with   moderate to severe chronic microvascular ischemic changes. There is no   mass effect or midline shift.  Cerebellar tonsils are in normal location.   The intracranial flow voids are normal in appearance. Visualized   paranasal sinuses and mastoids are normal in signal.    MRA of the [place] demonstrates normal antegrade flow in the   major intracranial arteries. No flow gap is seen to suggest high grade   stenosis. No evidence of medium or large sized aneurysm.    MRA of the neck demonstrates normal antegrade flow in the bilateral   common carotid, cervical internal carotid and vertebral arteries. No flow   gap is seen to suggest high grade stenosis.    < end of copied text >    EKG:  sinus rhythm, 1st degree AV block  Telemetry:    ECHO: CHIEF COMPLAINT: Patient is a 63y old  Male who presents with a chief complaint of Patient is a 63y old  Male who presents with a chief complaint of left facial droop. (02 Feb 2019 16:32)      HPI:  63M.  admitted 02/01/2019.  presented from his psychotherapist's office in [place] to ED due to a L sided facial droop.  this was noted by the therapist (not the patient, as he purportedly never looks at himself in the mirror).  EMS was called.  Code stroke was not called at triage, has it was unknown onset of symptom.  incidentally, patient was admitted to [facility] 3 weeks prior to current admission due to "black stool."  patient underwent an EGD which found an "old, nonbleeding ucler" according to patient.  additionally, we was informed he had an, "irregular heart beat."  cardiology w/u at that time was unrevealing and were unable to document a reoccurrence.      2/3-patient admitted for facial droop; MRI in ER-with no acute findings.  Evaluation shows slight facial droop of unknown duration.  Has been to multiple hospitals for multiple reasons-related to living conditions.  States he feels palpitations.  Prior c/o at [facility] failed to reveal diagnosis with cardiac w/u.  does state that his heart has irregularity on BP cu8ff at home and was told many time his heart beat "pauses" and "restarts".  * years ago, in [facility]-was on tele floor and nurses rushed in asking if he was ok-told his heart stopped.  Has had multiple episodes of hypotension/syncope-unknown cause (felt to be due to HD and low BP)  Patient with no c/o of weakness, dizziness or difficulty eating food/speaking    ROS:  (-) HA, visual changes, dysarthria, dysmetria, motor weakness or ataxia, recurrent melena or hematochezia, suicidal or homicidal ideations.    PMHx:  HTN;  ESRT-RRT TTS + RUE AVF;  depression.    PSHx:  RUE AVF;  L foot sx.    ALL:  doxazosin.    Rx:  reviewed.    SocHx:  on disability due to ESRD.  previously, worked numerous odd jobs.  denied tobacco, EtOH or illegal drugs. (01 Feb 2019 16:30)      PMHx: PAST MEDICAL & SURGICAL HISTORY:  Kidney disease: needs fistula  PVD (peripheral vascular disease)  HTN (hypertension)  No significant past surgical history        Soc Hx:       Allergies: Allergies    doxazosin (Hives)  Doxazosin Mesylate (Unknown)    Intolerances          REVIEW OF SYSTEMS:    CONSTITUTIONAL: No weakness, fevers or chills  EYES/ENT: No visual changes;  No vertigo or throat pain   NECK: No pain or stiffness  RESPIRATORY:  No shortness of breath  CARDIOVASCULAR: No chest pain or palpitations  GASTROINTESTINAL: No abdominal or epigastric pain. No nausea, vomiting, or hematemesis; No diarrhea or constipation. No melena or hematochezia.  GENITOURINARY: No dysuria, frequency or hematuria  NEUROLOGICAL: No numbness or weakness  SKIN: No itching, burning, rashes, or lesions   All other review of systems is negative unless indicated above    Vital Signs Last 24 Hrs  T(C): 36.7 (03 Feb 2019 05:36), Max: 37 (02 Feb 2019 15:30)  T(F): 98 (03 Feb 2019 05:36), Max: 98.6 (02 Feb 2019 15:30)  HR: 61 (03 Feb 2019 05:36) (60 - 70)  BP: 120/59 (03 Feb 2019 05:36) (120/59 - 143/74)  BP(mean): --  RR: 16 (02 Feb 2019 20:54) (16 - 17)  SpO2: 93% (03 Feb 2019 05:36) (93% - 98%)    I&O's Summary    02 Feb 2019 07:01  -  03 Feb 2019 06:56  --------------------------------------------------------  IN: 0 mL / OUT: 300 mL / NET: -300 mL        CAPILLARY BLOOD GLUCOSE          PHYSICAL EXAM:   Constitutional: NAD, awake and alert, well-developed  HEENT: PERR, EOMI, Normal Hearing, MMM  Neck: Soft and supple, No LAD, No JVD  Respiratory: Breath sounds are clear bilaterally, No wheezing, rales or rhonchi  Cardiovascular: S1 and S2, regular rate and rhythm, no Murmurs, gallops or rubs  Gastrointestinal: Bowel Sounds present, soft, nontender, nondistended, no guarding, no rebound  Extremities: No peripheral edema  Vascular: 2+ peripheral pulses  Neurological: A/O x 3, no focal deficits(questionable minimal asymmetry of mouth-normal varient with all CN intact with no slurred speech, good strength bilaterally upper and lower extremities with no drift.  Musculoskeletal: 5/5 strength b/l upper and lower extremities  Skin: No rashes    MEDICATIONS:  MEDICATIONS  (STANDING):  aspirin 325 milliGRAM(s) Oral daily  atorvastatin 40 milliGRAM(s) Oral at bedtime  cloNIDine 0.1 milliGRAM(s) Oral two times a day  docusate sodium 100 milliGRAM(s) Oral three times a day  epoetin rajwinder Injectable 8000 Unit(s) IV Push <User Schedule>  heparin  Injectable 1000 Unit(s) Dialysis. <User Schedule>  heparin  Injectable 500 Unit(s) Dialysis. <User Schedule>  hydrALAZINE 100 milliGRAM(s) Oral two times a day  lidocaine/prilocaine Cream 1 Application(s) Topical <User Schedule>  NIFEdipine XL 90 milliGRAM(s) Oral daily  pantoprazole    Tablet 40 milliGRAM(s) Oral before breakfast  sertraline 100 milliGRAM(s) Oral daily  sucralfate 1 Gram(s) Oral four times a day      LABS: All Labs Reviewed:                        10.7   8.05  )-----------( 262      ( 03 Feb 2019 05:22 )             32.5     02-02    140  |  106  |  49<H>  ----------------------------<  94  4.1   |  23  |  9.14<H>    Ca    8.4<L>      02 Feb 2019 06:37    TPro  8.2  /  Alb  3.2<L>  /  TBili  0.4  /  DBili  x   /  AST  19  /  ALT  25  /  AlkPhos  119  02-01    PT/INR - ( 01 Feb 2019 12:54 )   PT: 12.3 sec;   INR: 1.10 ratio         PTT - ( 01 Feb 2019 12:54 )  PTT:26.3 sec  CARDIAC MARKERS ( 01 Feb 2019 12:54 )  <0.015 ng/mL / x     / x     / x     / x            Blood Culture:   lipid profile       RADIOLOGY:  < from: MR Head No Cont (02.01.19 @ 17:42) >  Ventricles and sulci are normal in size. There is no acute infarct,   hemorrhage or mass lesion. Multiple chronic lacunar infarcts in the   bilateral basal ganglia are present. Patchy foci of T2/FLAIR   hyperintensity in the periventricular white matter, compatible with   moderate to severe chronic microvascular ischemic changes. There is no   mass effect or midline shift.  Cerebellar tonsils are in normal location.   The intracranial flow voids are normal in appearance. Visualized   paranasal sinuses and mastoids are normal in signal.    MRA of the [place] demonstrates normal antegrade flow in the   major intracranial arteries. No flow gap is seen to suggest high grade   stenosis. No evidence of medium or large sized aneurysm.    MRA of the neck demonstrates normal antegrade flow in the bilateral   common carotid, cervical internal carotid and vertebral arteries. No flow   gap is seen to suggest high grade stenosis.    < end of copied text >    EKG:  sinus rhythm, 1st degree AV block  Telemetry:    ECHO:

## 2019-02-03 NOTE — PROGRESS NOTE ADULT - ASSESSMENT
There is a question of recent or prior neurologic event and he has had an irregular HB  Patient has ESRD on dialysis.  Would suggest an ILR to further monitor.   Discussed with patient - procedure and followup monitoring. He is agreeable  Will implant in am.

## 2019-02-03 NOTE — PROGRESS NOTE ADULT - ASSESSMENT
62 yo male with hx of HTN, Depression and ESRD on HD TTS at [facility] presents from psychotherapy office with left facial droop asymptomatic and negative acute neuro workup so far    Left facial droop    - Neuro workup neg   - loop recorder per EPS       ESRD on HD TTS  - HD as per orders  - epo at HD    HTN   - stable on home regimen      Thank you for the courtesy of this consult. We will follow this patient with you.   Management is subject to change if new information becomes available or patient condition changes.

## 2019-02-04 VITALS — HEART RATE: 66 BPM | SYSTOLIC BLOOD PRESSURE: 124 MMHG | DIASTOLIC BLOOD PRESSURE: 62 MMHG

## 2019-02-04 PROCEDURE — 33285 INSJ SUBQ CAR RHYTHM MNTR: CPT

## 2019-02-04 PROCEDURE — 99232 SBSQ HOSP IP/OBS MODERATE 35: CPT

## 2019-02-04 RX ADMIN — Medication 325 MILLIGRAM(S): at 11:58

## 2019-02-04 RX ADMIN — SERTRALINE 100 MILLIGRAM(S): 25 TABLET, FILM COATED ORAL at 11:58

## 2019-02-04 RX ADMIN — Medication 1 GRAM(S): at 11:58

## 2019-02-04 RX ADMIN — PANTOPRAZOLE SODIUM 40 MILLIGRAM(S): 20 TABLET, DELAYED RELEASE ORAL at 05:48

## 2019-02-04 RX ADMIN — Medication 1 GRAM(S): at 05:48

## 2019-02-04 RX ADMIN — Medication 90 MILLIGRAM(S): at 05:48

## 2019-02-04 NOTE — PROGRESS NOTE ADULT - SUBJECTIVE AND OBJECTIVE BOX
63M.  admitted 02/01/2019.  presented from his psychotherapist's office in [place] to ED due to a L sided facial droop.  this was noted by the therapist (not the patient, as he purportedly never looks at himself in the mirror).  EMS was called.  Code stroke was not called at triage, has it was unknown onset of symptom.    incidentally, patient was admitted to [facility] 3 weeks prior to current admission due to "black stool."  patient underwent an EGD which found an "old, nonbleeding ucler" according to patient.  additionally, we was informed he had an, "irregular heart beat."  cardiology w/u at that time was unrevealing and were unable to document a reoccurrence.      2/3: Patient seen and evaluated at bedside. No acute complaints. S/p loop recorder placement. Will be discharged.       Review of Systems:  CONSTITUTIONAL: No weakness, fevers or chills  EYES/ENT: No visual changes;  No vertigo or throat pain   NECK: No pain or stiffness  RESPIRATORY: No cough, wheezing, hemoptysis; No shortness of breath,   CARDIOVASCULAR: No chest pain or palpitations  GASTROINTESTINAL: No abdominal or epigastric pain. No nausea, vomiting, or hematemesis; No diarrhea or constipation.   GENITOURINARY: No dysuria, frequency or hematuria  NEUROLOGICAL: No numbness or weakness  SKIN: No itching, burning, rashes, or lesions   All other review of systems is negative unless indicated above    MEDICATIONS  (STANDING):  aspirin 325 milliGRAM(s) Oral daily  atorvastatin 40 milliGRAM(s) Oral at bedtime  cloNIDine 0.1 milliGRAM(s) Oral two times a day  docusate sodium 100 milliGRAM(s) Oral three times a day  epoetin rajwinder Injectable 8000 Unit(s) IV Push <User Schedule>  heparin  Injectable 1000 Unit(s) Dialysis. <User Schedule>  heparin  Injectable 500 Unit(s) Dialysis. <User Schedule>  heparin  Injectable 5000 Unit(s) SubCutaneous every 8 hours  hydrALAZINE 100 milliGRAM(s) Oral two times a day  lidocaine/prilocaine Cream 1 Application(s) Topical <User Schedule>  NIFEdipine XL 90 milliGRAM(s) Oral daily  pantoprazole    Tablet 40 milliGRAM(s) Oral before breakfast  sertraline 100 milliGRAM(s) Oral daily  sucralfate 1 Gram(s) Oral four times a day    MEDICATIONS  (PRN):  acetaminophen   Tablet .. 650 milliGRAM(s) Oral every 6 hours PRN Mild Pain (1 - 3)  mirtazapine 7.5 milliGRAM(s) Oral at bedtime PRN insomnia    Vital Signs (24 Hrs):  T(C): 36.3 (02-04-19 @ 10:54), Max: 36.6 (02-03-19 @ 20:55)  HR: 66 (02-04-19 @ 12:15) (61 - 74)  BP: 124/62 (02-04-19 @ 12:15) (103/49 - 144/58)  RR: 18 (02-04-19 @ 10:54) (17 - 18)  SpO2: 99% (02-04-19 @ 10:54) (98% - 100%)  Wt(kg): --  Daily     Daily     I&O's Summary    Physical Exam:  Constitutional: NAD, awake and alert, well-developed; obese  Neck: Soft and supple, No LAD, No JVD  Respiratory: cta b/l no wheezing no rhonchi  Cardiovascular: +s1/s2  Gastrointestinal: soft nt nd bs+  Extremities: no edema b/l le  Vascular: 2+ peripheral pulses  Neurological: A/O x 3, no facial muscle weakness  Musculoskeletal: 5/5 strength b/l upper and lower extremities       PRN Mild Pain (1 - 3)  mirtazapine 7.5 milliGRAM(s) Oral at bedtime PRN insomnia      LABS:      Ca    8.7        03 Feb 2019 05:22          Occult Blood, Feces (02.02.19 @ 20:01)    Occult Blood, Feces: Negative    Acute Hepatitis Panel (02.02.19 @ 16:45)    Hepatitis C Virus Interpretation: Nonreact    Hepatitis C Virus S/CO Ratio: 0.15 S/CO    Hepatitis B Core IgM Antibody: Nonreact    Hepatitis B Surface Antigen: Nonreact    Hepatitis A IgM Antibody: Nonreact    Hemoglobin A1C with Mean Plasma Glucose (02.02.19 @ 06:37)    Hemoglobin A1C, Whole Blood: 4.8    Mean Plasma Glucose: 91 mg/dL    Thyroid Stimulating Hormone, Serum: 1.25 uU/mL (02.01.19 @ 12:54)    CARDIAC MARKERS ( 01 Feb 2019 12:54 )  <0.015 ng/mL / x     / x     / x     / x        Radiology  < from: MR Angio Neck No Cont (02.01.19 @ 17:43) >  IMPRESSION:  MRI brain: No acute infarct. Multiple chronic lacunar infarcts in  bilateral basal ganglia.  Patchy foci of T2/FLAIR hyperintensity in the   periventricular white matter, compatible with moderate to severe chronic   microvascular ischemic changes.   MRA brain: No hemodynamically significant stenosis.  MRA neck:  No hemodynamically significant stenosis.  < end of copied text >    < from: CT Head No Cont (02.01.19 @ 14:25) >  IMPRESSION:     No evidence of acute intracranial abnormality.  No evidence of   hemorrhage.    Chronic lacunar infarct as above.  < end of copied text >    < from: Xray Chest 1 View- PORTABLE-Urgent (02.01.19 @ 13:38) >  IMPRESSION: No infiltrate.  < end of copied text >    DVT prophylaxis   - heparin SC and ambulation
63M.  admitted 2019.  presented from his psychotherapist's office in [place] to ED due to a L sided facial droop.  this was noted by the therapist (not the patient, as he purportedly never looks at himself in the mirror).  EMS was called.  Code stroke was not called at triage, has it was unknown onset of symptom.    incidentally, patient was admitted to [facility] 3 weeks prior to current admission due to "black stool."  patient underwent an EGD which found an "old, nonbleeding ucler" according to patient.  additionally, we was informed he had an, "irregular heart beat."  cardiology w/u at that time was unrevealing and were unable to document a reoccurrence.      : Patient evaluated at bedside. No acute complaints. AOx3. Denies any new weakness, sensory deficits, chest pain, palpitations, SOB.    REVIEW OF SYSTEMS:    CONSTITUTIONAL: No weakness, fevers or chills  EYES/ENT: No visual changes;  No vertigo or throat pain   NECK: No pain or stiffness  RESPIRATORY: No cough, wheezing, hemoptysis; No shortness of breath  CARDIOVASCULAR: No chest pain or palpitations  GASTROINTESTINAL: No abdominal or epigastric pain. No nausea, vomiting, or hematemesis; No diarrhea or constipation. No melena or hematochezia.  GENITOURINARY: No dysuria, frequency or hematuria  NEUROLOGICAL: No numbness or weakness  SKIN: No itching, burning, rashes, or lesions   All other review of systems is negative unless indicated above.    MEDICATIONS  (STANDING):  aspirin 325 milliGRAM(s) Oral daily  atorvastatin 40 milliGRAM(s) Oral at bedtime  cloNIDine 0.1 milliGRAM(s) Oral two times a day  docusate sodium 100 milliGRAM(s) Oral three times a day  epoetin rajwinder Injectable 8000 Unit(s) IV Push <User Schedule>  hydrALAZINE 100 milliGRAM(s) Oral two times a day  lidocaine/prilocaine Cream 1 Application(s) Topical <User Schedule>  NIFEdipine XL 90 milliGRAM(s) Oral daily  pantoprazole    Tablet 40 milliGRAM(s) Oral before breakfast  sertraline 100 milliGRAM(s) Oral daily  sucralfate 1 Gram(s) Oral four times a day    MEDICATIONS  (PRN):  acetaminophen   Tablet .. 650 milliGRAM(s) Oral every 6 hours PRN Mild Pain (1 - 3)  mirtazapine 7.5 milliGRAM(s) Oral at bedtime PRN insomnia    Vital Signs (24 Hrs):  T(C): 36.8 (19 @ 11:27), Max: 36.9 (19 @ 19:31)  HR: 68 (19 @ 11:27) (65 - 70)  BP: 131/74 (19 @ 11:27) (107/50 - 145/78)  RR: 17 (19 @ 11:27) (16 - 17)  SpO2: 98% (19 @ 11:27) (97% - 100%)  Wt(kg): --  Daily     Daily Weight in k.6 (2019 18:00)    I&O's Summary    2019 07:01  -  2019 14:51  --------------------------------------------------------  IN: 0 mL / OUT: 300 mL / NET: -300 mL      PHYSICAL EXAM:  GENERAL: NAD, well-developed, obese, no aphasia  HEAD:  Atraumatic, Normocephalic, no facial droop noticed  EYES: EOMI, PERRLA, conjunctiva and sclera clear  NECK: Supple, No JVD  CHEST/LUNG: Clear to auscultation bilaterally; No wheeze, ronchi or rales  HEART: Regular rate; No murmurs, rubs, or gallops  ABDOMEN: Soft, Nontender, distended; Bowel sounds present  EXTREMITIES:  strength 5/5 in all extremities  PSYCH: AAOx3  NEUROLOGY: non-focal  SKIN: No rashes or lesions    LABS:                        10.1   9.80  )-----------( 263      ( 2019 06:37 )             30.9     2019 06:37    140    |  106    |  49     ----------------------------<  94     4.1     |  23     |  9.14     Ca    8.4        2019 06:37      PT/INR - ( 2019 12:54 )   PT: 12.3 sec;   INR: 1.10 ratio         PTT - ( 2019 12:54 )  PTT:26.3 sec    < from: MR Angio Neck No Cont (19 @ 17:43) >  MRI brain: No acute infarct. Multiple chronic lacunar infarcts in  bilateral basal ganglia.  Patchy foci of T2/FLAIR hyperintensity in the   periventricular white matter, compatible with moderate to severe chronic   microvascular ischemic changes.     MRA brain: No hemodynamically significant stenosis.    MRA neck:  No hemodynamically significant stenosis.    < end of copied text >        < from: CT Head No Cont (19 @ 14:25) >  IMPRESSION:       No evidence of acute intracranial abnormality.  No evidence of   hemorrhage.      Chronic lacunar infarct as above.    < end of copied text >
CHIEF COMPLAINT: Patient is a 63y old  Male who presents with a chief complaint of Patient is a 63y old  Male who presents with a chief complaint of left facial droop. (03 Feb 2019 17:05)      HPI:  63M.  admitted 02/01/2019.  presented from his psychotherapist's office in [place] to ED due to a L sided facial droop.  this was noted by the therapist (not the patient, as he purportedly never looks at himself in the mirror).  EMS was called.  Code stroke was not called at triage, has it was unknown onset of symptom.    incidentally, patient was admitted to [facility] 3 weeks prior to current admission due to "black stool."  patient underwent an EGD which found an "old, nonbleeding ucler" according to patient.  additionally, we was informed he had an, "irregular heart beat."  cardiology w/u at that time was unrevealing and were unable to document a reoccurrence.      ROS:  (-) HA, visual changes, dysarthria, dysmetria, motor weakness or ataxia, recurrent melena or hematochezia, suicidal or homicidal ideations.    2/3-patient admitted for facial droop; MRI in ER-with no acute findings.  Evaluation shows slight facial droop of unknown duration.  Has been to multiple hospitals for multiple reasons-related to living conditions.  States he feels palpitations.  Prior c/o at [facility] failed to reveal diagnosis with cardiac w/u.  does state that his heart has irregularity on BP cu8ff at home and was told many time his heart beat "pauses" and "restarts".  * years ago, in [facility]-was on tele floor and nurses rushed in asking if he was ok-told his heart stopped.  Has had multiple episodes of hypotension/syncope-unknown cause (felt to be due to HD and low BP)  Patient with no c/o of weakness, dizziness or difficulty eating food/speaking    2/4-evaluation with neuro, renal and EPS done and patient felt not to have an acute event.  Otherwise, c/o palpitations/heart stopping in the past.  No c/o of chest pains, SOB or syncope    PMHx:  HTN;  ESRT-RRT TTS + RUE AVF;  depression.    PSHx:  RUE AVF;  L foot sx.    ALL:  doxazosin.    Rx:  reviewed.    SocHx:  on disability due to ESRD.  previously, worked numerous odd jobs.  denied tobacco, EtOH or illegal drugs. (01 Feb 2019 16:30)      PMHx: PAST MEDICAL & SURGICAL HISTORY:  Kidney disease: needs fistula  PVD (peripheral vascular disease)  HTN (hypertension)  No significant past surgical history      Allergies: Allergies    doxazosin (Hives)  Doxazosin Mesylate (Unknown)    Intolerances          REVIEW OF SYSTEMS:    CONSTITUTIONAL: No weakness, fevers or chills  EYES/ENT: No visual changes;  No vertigo or throat pain   NECK: No pain or stiffness  RESPIRATORY: No cough, wheezing, hemoptysis; No shortness of breath  CARDIOVASCULAR: No chest pain or palpitations  GASTROINTESTINAL: No abdominal or epigastric pain. No nausea, vomiting, or hematemesis; No diarrhea or constipation. No melena or hematochezia.  GENITOURINARY: No dysuria, frequency or hematuria  NEUROLOGICAL: No numbness or weakness  SKIN: No itching, burning, rashes, or lesions   All other review of systems is negative unless indicated above    Vital Signs Last 24 Hrs  T(C): 36.6 (04 Feb 2019 04:37), Max: 36.6 (03 Feb 2019 20:55)  T(F): 97.9 (04 Feb 2019 04:37), Max: 97.9 (04 Feb 2019 04:37)  HR: 61 (04 Feb 2019 04:37) (61 - 74)  BP: 103/49 (04 Feb 2019 04:37) (103/49 - 118/65)  BP(mean): --  RR: 17 (03 Feb 2019 20:55) (17 - 17)  SpO2: 100% (04 Feb 2019 04:37) (95% - 100%)    I&O's Summary    02 Feb 2019 07:01  -  03 Feb 2019 07:00  --------------------------------------------------------  IN: 0 mL / OUT: 300 mL / NET: -300 mL            PHYSICAL EXAM:   Constitutional: NAD, awake and alert, well-developed  HEENT: PERR, EOMI, Normal Hearing, MMM  Neck: Soft and supple, No LAD, No JVD  Respiratory: Breath sounds are clear bilaterally, No wheezing, rales or rhonchi  Cardiovascular: S1 and S2, regular rate and rhythm, no Murmurs, gallops or rubs  Gastrointestinal: Bowel Sounds present, soft, nontender, nondistended, no guarding, no rebound  Extremities: No peripheral edema  Vascular: 2+ peripheral pulses  Neurological: A/O x 3, no focal deficits  Musculoskeletal: 5/5 strength b/l upper and lower extremities  Skin: No rashes    MEDICATIONS:  MEDICATIONS  (STANDING):  aspirin 325 milliGRAM(s) Oral daily  atorvastatin 40 milliGRAM(s) Oral at bedtime  cloNIDine 0.1 milliGRAM(s) Oral two times a day  docusate sodium 100 milliGRAM(s) Oral three times a day  epoetin rajwinder Injectable 8000 Unit(s) IV Push <User Schedule>  heparin  Injectable 1000 Unit(s) Dialysis. <User Schedule>  heparin  Injectable 500 Unit(s) Dialysis. <User Schedule>  heparin  Injectable 5000 Unit(s) SubCutaneous every 8 hours  hydrALAZINE 100 milliGRAM(s) Oral two times a day  lidocaine/prilocaine Cream 1 Application(s) Topical <User Schedule>  NIFEdipine XL 90 milliGRAM(s) Oral daily  pantoprazole    Tablet 40 milliGRAM(s) Oral before breakfast  sertraline 100 milliGRAM(s) Oral daily  sucralfate 1 Gram(s) Oral four times a day      LABS: All Labs Reviewed:                        10.7   8.05  )-----------( 262      ( 03 Feb 2019 05:22 )             32.5     02-03    140  |  102  |  30<H>  ----------------------------<  85  3.8   |  30  |  6.23<H>    Ca    8.7      03 Feb 2019 05:22            Blood Culture:       BNP     RADIOLOGY:    EKG:      Telemetry:  sinus rhythm, PVC's    ECHO:  < from: Transthoracic Echocardiogram Follow Up (02.02.19 @ 09:01) >   Minor Fibrocalcific changes noted to the mitral valve leaflets with   preserved leaflet excursion.   Mild (1+) mitral regurgitation is present.   Normal aortic valve structure and function.   Normal appearing tricuspid valve structure and function.   Trace tricuspid valve regurgitation is present.   Normal appearing pulmonic valve structure and function.   The left atrium is mildly dilated.   Moderate concentric left ventricular hypertrophy is present. Sigmoidal   septal segment is seen.   Estimated left ventricular ejection fraction is 55-60 %.   Normal appearing right atrium.   Normal appearing right ventricle structure and function.   No evidence of pericardial effusion.    < end of copied text >
COVERING FOR DR Flynn    63M.  admitted 02/01/2019.  presented from his psychotherapist's office in [place] to ED due to a L sided facial droop.  this was noted by the therapist (not the patient, as he purportedly never looks at himself in the mirror).  EMS was called.  Code stroke was not called at triage, as it was unknown onset without symptoms.   MRI/MRA revealed old lacunar infarcts , no acute findings and no hemodynamic stenosis   Renal eval called today for hx of ESRD on HD on HD TTS - due today   PMHX: ESRD on HD since march 2018 , HTN,    regualr unit is [facility]     incidentally, patient was admitted to [facility] 3 weeks prior to current admission due to "black stool."  patient underwent an EGD which found an "old, nonbleeding ucler" according to patient.  additionally, we was informed he had an, "irregular heart beat."  cardiology w/u at that time was unrevealing and were unable to document a reoccurrence.      ROS:  (-) HA, visual changes, dysarthria, dysmetria, motor weakness or ataxia, recurrent melena or hematochezia, suic    today pt has no complaints  no sob or cp   for loop recorder in AM       2/2   tolerated HD   minimal fluid off at HD due to residual UOP    had recent AVG revision done     PAST MEDICAL & SURGICAL HISTORY:  ESRD on HD since march 2018   PVD (peripheral vascular disease)  HTN (hypertension)  No significant past surgical history    Home Medications:  ARIPiprazole 2 mg oral tablet: 1 tab(s) orally once a day  Pt reports he stopped taking this per provider&#x27;s instructions, 2-3 weeks ago, to assess if this was exacerbating his ulcer (01 Feb 2019 16:33)  calcium acetate 667 mg oral tablet: 1 tab(s) orally 3 times a day  Pt ran out and not adherent (01 Feb 2019 16:33)  Centrum oral tablet: 1 tab(s) orally once a day (01 Feb 2019 16:33)  cloNIDine 0.1 mg oral tablet: 1 tab(s) orally 2 times a day (01 Feb 2019 16:33)  magnesium oxide 400 mg (241.3 mg elemental magnesium) oral tablet: 1 tab(s) orally once a day (01 Feb 2019 16:33)  mirtazapine 7.5 mg oral tablet: 1 tab(s) orally once a day (at bedtime), As Needed (01 Feb 2019 16:33)  NIFEdipine 90 mg oral tablet, extended release: 1 tab(s) orally once a day (01 Feb 2019 16:33)  pantoprazole 40 mg oral delayed release tablet: 1 tab(s) orally once a day    Filled by pharmacy 1/26/19 but pt unaware of med (01 Feb 2019 16:33)  Yeni-Donna oral tablet: 1 tab(s) orally once a day    Pt not adherent with this med (01 Feb 2019 16:33)  sertraline 50 mg oral tablet: 2 tab(s) orally once a day (01 Feb 2019 16:33)  sucralfate 1 g oral tablet: 1 tab(s) orally 4 times a day (before meals and at bedtime)    Pt reports he often only has one meal, thus only takes this twice daily (01 Feb 2019 16:33)    MEDICATIONS  (STANDING):  aspirin 325 milliGRAM(s) Oral daily  atorvastatin 40 milliGRAM(s) Oral at bedtime  cloNIDine 0.1 milliGRAM(s) Oral two times a day  docusate sodium 100 milliGRAM(s) Oral three times a day  epoetin rajwinder Injectable 8000 Unit(s) IV Push <User Schedule>  heparin  Injectable 1000 Unit(s) Dialysis. <User Schedule>  heparin  Injectable 500 Unit(s) Dialysis. <User Schedule>  heparin  Injectable 5000 Unit(s) SubCutaneous every 8 hours  hydrALAZINE 100 milliGRAM(s) Oral two times a day  lidocaine/prilocaine Cream 1 Application(s) Topical <User Schedule>  NIFEdipine XL 90 milliGRAM(s) Oral daily  pantoprazole    Tablet 40 milliGRAM(s) Oral before breakfast  sertraline 100 milliGRAM(s) Oral daily  sucralfate 1 Gram(s) Oral four times a day    MEDICATIONS  (PRN):  acetaminophen   Tablet .. 650 milliGRAM(s) Oral every 6 hours PRN Mild Pain (1 - 3)  mirtazapine 7.5 milliGRAM(s) Oral at bedtime PRN insomnia      Allergies    doxazosin (Hives)  Doxazosin Mesylate (Unknown)    Intolerances        SOCIAL HISTORY:  Denies ETOh,Smoking,     FAMILY HISTORY:      REVIEW OF SYSTEMS:    CONSTITUTIONAL: No weakness, fevers or chills  EYES/ENT: No visual changes;  No vertigo or throat pain   NECK: No pain or stiffness  RESPIRATORY: No cough, wheezing, hemoptysis; No shortness of breath  CARDIOVASCULAR: No chest pain or palpitations  GASTROINTESTINAL: No abdominal or epigastric pain. No nausea, vomiting, or hematemesis; No diarrhea or constipation. No melena or hematochezia.  GENITOURINARY: No dysuria, frequency or hematuria  NEUROLOGICAL: No numbness or weakness  SKIN: No itching, burning, rashes, or lesions   All other review of systems is negative unless indicated above.    Vital Signs Last 24 Hrs  T(C): 36.2 (03 Feb 2019 11:33), Max: 37 (02 Feb 2019 18:31)  T(F): 97.1 (03 Feb 2019 11:33), Max: 98.6 (02 Feb 2019 18:31)  HR: 64 (03 Feb 2019 11:33) (61 - 70)  BP: 115/61 (03 Feb 2019 11:33) (115/61 - 143/74)  BP(mean): --  RR: 17 (03 Feb 2019 11:33) (16 - 17)  SpO2: 95% (03 Feb 2019 11:33) (93% - 95%)    I and O's:    Height (cm): 177.8 (02-01 @ 13:00)  Weight (kg): 127 (02-01 @ 13:00)  BMI (kg/m2): 40.2 (02-01 @ 13:00)  BSA (m2): 2.41 (02-01 @ 13:00)    PHYSICAL EXAM:    Constitutional: NAD  HEENT:  EOMI,  MMM  Neck: No LAD, No JVD  Respiratory: CTAB  Cardiovascular: S1 and S2  Gastrointestinal: BS+, soft, NT/ND  Extremities: No peripheral edema  Neurological: A/O x 3, vague left facial loss of nasolabila fold but full rom of face with smiling   moving all ext   : No Hair  Skin: No rashes  Access:RUE AVG = thrill and bruit     LABS:      140    |  102    |  30     ----------------------------<  85        03 Feb 2019 05:22  3.8     |  30     |  6.23     140    |  106    |  49     ----------------------------<  94        02 Feb 2019 06:37  4.1     |  23     |  9.14     140    |  104    |  35     ----------------------------<  82        01 Feb 2019 12:54  3.8     |  28     |  7.47     Ca    8.7        03 Feb 2019 05:22  Ca    8.4        02 Feb 2019 06:37        TPro  8.2    /  Alb  3.2    /  TBili  0.4    /        01 Feb 2019 12:54  DBili  x      /  AST  19     /  ALT  25     /  AlkPhos  119        14 )             33.5         Urine Studies:      RADIOLOGY & ADDITIONAL STUDIES:
Interval History:  2/2/19: No new complaints at this time.     MEDICATIONS  (STANDING):  aspirin 325 milliGRAM(s) Oral daily  atorvastatin 40 milliGRAM(s) Oral at bedtime  cloNIDine 0.1 milliGRAM(s) Oral two times a day  docusate sodium 100 milliGRAM(s) Oral three times a day  epoetin rajwinder Injectable 8000 Unit(s) IV Push <User Schedule>  hydrALAZINE 100 milliGRAM(s) Oral two times a day  lidocaine/prilocaine Cream 1 Application(s) Topical <User Schedule>  NIFEdipine XL 90 milliGRAM(s) Oral daily  pantoprazole    Tablet 40 milliGRAM(s) Oral before breakfast  sertraline 100 milliGRAM(s) Oral daily  sucralfate 1 Gram(s) Oral four times a day    MEDICATIONS  (PRN):  acetaminophen   Tablet .. 650 milliGRAM(s) Oral every 6 hours PRN Mild Pain (1 - 3)  mirtazapine 7.5 milliGRAM(s) Oral at bedtime PRN insomnia      Allergies    doxazosin (Hives)  Doxazosin Mesylate (Unknown)    Intolerances        PHYSICAL EXAM:  Vital Signs Last 24 Hrs  T(F): 98.3 (02-02-19 @ 11:27)  HR: 68 (02-02-19 @ 11:27)  BP: 131/74 (02-02-19 @ 11:27)  RR: 17 (02-02-19 @ 11:27)    GENERAL: NAD, well-groomed, well-developed  HEAD:  Atraumatic, Normocephalic  Neuro:  Awake, alert, no aphasia  CN: PERRL, EOMI, no nystagmus, mild decreased left nasolabial fold, tongue protrudes in the midline  motor: normal tone, no pronator drift, full strength in all four extremities  sensory: intact to light touch  coordination: finger to nose intact bilaterally  DTRs: symmetric, plantar responses flexor bilaterally    LABS:                        10.1   9.80  )-----------( 263      ( 02 Feb 2019 06:37 )             30.9     02-02    140  |  106  |  49<H>  ----------------------------<  94  4.1   |  23  |  9.14<H>    Ca    8.4<L>      02 Feb 2019 06:37    TPro  8.2  /  Alb  3.2<L>  /  TBili  0.4  /  DBili  x   /  AST  19  /  ALT  25  /  AlkPhos  119  02-01    PT/INR - ( 01 Feb 2019 12:54 )   PT: 12.3 sec;   INR: 1.10 ratio         PTT - ( 01 Feb 2019 12:54 )  PTT:26.3 sec      RADIOLOGY & ADDITIONAL STUDIES:  CT head 2/1/19:  No evidence of acute intracranial abnormality.  No evidence of   hemorrhage.      Chronic lacunar infarct as above.      MRI brain/MRA brain/MRA neck 2/1/19:  MRI brain: No acute infarct. Multiple chronic lacunar infarcts in  bilateral basal ganglia.  Patchy foci of T2/FLAIR hyperintensity in the   periventricular white matter, compatible with moderate to severe chronic   microvascular ischemic changes.     MRA brain: No hemodynamically significant stenosis.    MRA neck:  No hemodynamically significant stenosis.
Interval History:  2/2/19: No new complaints at this time.   2/3/19: No new neurological complaints at this time including focal weakness or numbness/tingling      MEDICATIONS  (STANDING):  aspirin 325 milliGRAM(s) Oral daily  atorvastatin 40 milliGRAM(s) Oral at bedtime  cloNIDine 0.1 milliGRAM(s) Oral two times a day  docusate sodium 100 milliGRAM(s) Oral three times a day  epoetin rajwinder Injectable 8000 Unit(s) IV Push <User Schedule>  heparin  Injectable 1000 Unit(s) Dialysis. <User Schedule>  heparin  Injectable 500 Unit(s) Dialysis. <User Schedule>  heparin  Injectable 5000 Unit(s) SubCutaneous every 8 hours  hydrALAZINE 100 milliGRAM(s) Oral two times a day  lidocaine/prilocaine Cream 1 Application(s) Topical <User Schedule>  NIFEdipine XL 90 milliGRAM(s) Oral daily  pantoprazole    Tablet 40 milliGRAM(s) Oral before breakfast  sertraline 100 milliGRAM(s) Oral daily  sucralfate 1 Gram(s) Oral four times a day    MEDICATIONS  (PRN):  acetaminophen   Tablet .. 650 milliGRAM(s) Oral every 6 hours PRN Mild Pain (1 - 3)  mirtazapine 7.5 milliGRAM(s) Oral at bedtime PRN insomnia      Allergies    doxazosin (Hives)  Doxazosin Mesylate (Unknown)    Intolerances        PHYSICAL EXAM:  Vital Signs Last 24 Hrs  T(F): 97.1 (02-03-19 @ 11:33)  HR: 64 (02-03-19 @ 11:33)  BP: 115/61 (02-03-19 @ 11:33)  RR: 17 (02-03-19 @ 11:33)    GENERAL: NAD, well-groomed, well-developed  HEAD:  Atraumatic, Normocephalic  Neuro:  Awake, alert, no aphasia  CN: PERRL, EOMI, no nystagmus, no facial weakness, tongue protrudes in the midline  motor: normal tone, no pronator drift, full strength in all four extremities  sensory: intact to light touch  coordination: finger to nose intact bilaterally  DTRs: symmetric, plantar responses flexor bilaterally    LABS:                        10.7   8.05  )-----------( 262      ( 03 Feb 2019 05:22 )             32.5     02-03    140  |  102  |  30<H>  ----------------------------<  85  3.8   |  30  |  6.23<H>    Ca    8.7      03 Feb 2019 05:22    TPro  8.2  /  Alb  3.2<L>  /  TBili  0.4  /  DBili  x   /  AST  19  /  ALT  25  /  AlkPhos  119  02-01    PT/INR - ( 01 Feb 2019 12:54 )   PT: 12.3 sec;   INR: 1.10 ratio         PTT - ( 01 Feb 2019 12:54 )  PTT:26.3 sec      RADIOLOGY & ADDITIONAL STUDIES:    CT head 2/1/19:  No evidence of acute intracranial abnormality.  No evidence of   hemorrhage.      Chronic lacunar infarct as above.      MRI brain/MRA brain/MRA neck 2/1/19:  MRI brain: No acute infarct. Multiple chronic lacunar infarcts in  bilateral basal ganglia.  Patchy foci of T2/FLAIR hyperintensity in the   periventricular white matter, compatible with moderate to severe chronic   microvascular ischemic changes.     MRA brain: No hemodynamically significant stenosis.    MRA neck:  No hemodynamically significant stenosis.
NEPHROLOGY INTERVAL HPI/OVERNIGHT EVENTS:  02-04-19 @ 13:39  doing well, no new c/o   s/p loop recorder placement    MEDICATIONS  (STANDING):  aspirin 325 milliGRAM(s) Oral daily  atorvastatin 40 milliGRAM(s) Oral at bedtime  cloNIDine 0.1 milliGRAM(s) Oral two times a day  docusate sodium 100 milliGRAM(s) Oral three times a day  epoetin rajwinder Injectable 8000 Unit(s) IV Push <User Schedule>  heparin  Injectable 1000 Unit(s) Dialysis. <User Schedule>  heparin  Injectable 500 Unit(s) Dialysis. <User Schedule>  heparin  Injectable 5000 Unit(s) SubCutaneous every 8 hours  hydrALAZINE 100 milliGRAM(s) Oral two times a day  lidocaine/prilocaine Cream 1 Application(s) Topical <User Schedule>  NIFEdipine XL 90 milliGRAM(s) Oral daily  pantoprazole    Tablet 40 milliGRAM(s) Oral before breakfast  sertraline 100 milliGRAM(s) Oral daily  sucralfate 1 Gram(s) Oral four times a day    MEDICATIONS  (PRN):  acetaminophen   Tablet .. 650 milliGRAM(s) Oral every 6 hours PRN Mild Pain (1 - 3)  mirtazapine 7.5 milliGRAM(s) Oral at bedtime PRN insomnia      Allergies    doxazosin (Hives)  Doxazosin Mesylate (Unknown)    Intolerances        I&O's Detail        Vital Signs Last 24 Hrs  T(C): 36.3 (04 Feb 2019 10:54), Max: 36.6 (03 Feb 2019 20:55)  T(F): 97.4 (04 Feb 2019 10:54), Max: 97.9 (04 Feb 2019 04:37)  HR: 67 (04 Feb 2019 10:54) (61 - 74)  BP: 144/58 (04 Feb 2019 10:54) (103/49 - 144/58)  BP(mean): --  RR: 18 (04 Feb 2019 10:54) (17 - 18)  SpO2: 99% (04 Feb 2019 10:54) (98% - 100%)  Daily     Daily     PHYSICAL EXAM:  General: alert. awake Ox3  HEENT: MMM  CV: s1s2 rrr  LUNGS: B/L CTA  EXT: no edema    LABS:                        10.7   8.05  )-----------( 262      ( 03 Feb 2019 05:22 )             32.5     02-03    140  |  102  |  30<H>  ----------------------------<  85  3.8   |  30  |  6.23<H>    Ca    8.7      03 Feb 2019 05:22
Patient seen because of ? recent neurologic event and ? arrhythmia.   He has had history of ? PVCs, irregular beats  No syncope, dizziness, lightheadedness, chest pain, SOB    EKG: SR  TELE: no sri or tachy arrhythmia    MEDICATIONS  (STANDING):  aspirin 325 milliGRAM(s) Oral daily  atorvastatin 40 milliGRAM(s) Oral at bedtime  cloNIDine 0.1 milliGRAM(s) Oral two times a day  docusate sodium 100 milliGRAM(s) Oral three times a day  epoetin rajwinder Injectable 8000 Unit(s) IV Push <User Schedule>  heparin  Injectable 1000 Unit(s) Dialysis. <User Schedule>  heparin  Injectable 500 Unit(s) Dialysis. <User Schedule>  heparin  Injectable 5000 Unit(s) SubCutaneous every 8 hours  hydrALAZINE 100 milliGRAM(s) Oral two times a day  lidocaine/prilocaine Cream 1 Application(s) Topical <User Schedule>  NIFEdipine XL 90 milliGRAM(s) Oral daily  pantoprazole    Tablet 40 milliGRAM(s) Oral before breakfast  sertraline 100 milliGRAM(s) Oral daily  sucralfate 1 Gram(s) Oral four times a day    MEDICATIONS  (PRN):  acetaminophen   Tablet .. 650 milliGRAM(s) Oral every 6 hours PRN Mild Pain (1 - 3)  mirtazapine 7.5 milliGRAM(s) Oral at bedtime PRN insomnia      Allergies    doxazosin (Hives)  Doxazosin Mesylate (Unknown)    Intolerances      PAST MEDICAL & SURGICAL HISTORY:  Kidney disease: needs fistula  PVD (peripheral vascular disease)  HTN (hypertension)  No significant past surgical history      Vital Signs Last 24 Hrs  T(C): 36.2 (03 Feb 2019 11:33), Max: 37 (02 Feb 2019 15:30)  T(F): 97.1 (03 Feb 2019 11:33), Max: 98.6 (02 Feb 2019 15:30)  HR: 64 (03 Feb 2019 11:33) (60 - 70)  BP: 115/61 (03 Feb 2019 11:33) (115/61 - 143/74)  BP(mean): --  RR: 17 (03 Feb 2019 11:33) (16 - 17)  SpO2: 95% (03 Feb 2019 11:33) (93% - 95%)    Physical Exam:  Constitutional: NAD, AAOx3  Cardiovascular: +S1S2 RRR  Pulmonary: CTA b/l, unlabored  Abd: soft NTND +BS    Extremities: no pedal edema, +distal pulses b/l  Neuro: non focal    LABS:                        10.7   8.05  )-----------( 262      ( 03 Feb 2019 05:22 )             32.5     02-03    140  |  102  |  30<H>  ----------------------------<  85  3.8   |  30  |  6.23<H>    Ca    8.7      03 Feb 2019 05:22
Pt has been seen and examined with FP resident, resident supervised agree with a/p       Patient is a 63y old  Male who presents with a chief complaint of Patient is a 63y old  Male who presents with a chief complaint of left facial droop. (02 Feb 2019 11:48)        HPI:  63M.  admitted 02/01/2019.  presented from his psychotherapist's office in [place] to ED due to a L sided facial droop.  this was noted by the therapist (not the patient, as he purportedly never looks at himself in the mirror).  EMS was called.  Code stroke was not called at triage, has it was unknown onset of symptom.          PHYSICAL EXAM:  Vital Signs Last 24 Hrs  T(C): 36.8 (02 Feb 2019 11:27), Max: 36.9 (01 Feb 2019 19:31)  T(F): 98.3 (02 Feb 2019 11:27), Max: 98.4 (01 Feb 2019 19:31)  HR: 68 (02 Feb 2019 11:27) (65 - 70)  BP: 131/74 (02 Feb 2019 11:27) (107/50 - 145/78)  BP(mean): --  RR: 17 (02 Feb 2019 11:27) (16 - 17)  SpO2: 98% (02 Feb 2019 11:27) (97% - 100%)  general- comfortable   -rs-aeeb,cta  -cvs-s1s2 normal   -p/a-soft,bs+      A/P    #facial drop- due to old stroke most likely -ct asa and statin     #d/c if cleared by cardiology team     #time spent 55 minutes. Discussed with Dr. Wolf
second visit   seen on HD  no complaints      uf 600 ml only as pt near DW  continue HD support
Interval History:  2/2/19: No new complaints at this time.   2/3/19: No new neurological complaints at this time including focal weakness or numbness/tingling  2/4/19: No neurological complaints today. Loop recorder was placed today.      MEDICATIONS  (STANDING):  aspirin 325 milliGRAM(s) Oral daily  atorvastatin 40 milliGRAM(s) Oral at bedtime  cloNIDine 0.1 milliGRAM(s) Oral two times a day  docusate sodium 100 milliGRAM(s) Oral three times a day  epoetin rajwinder Injectable 8000 Unit(s) IV Push <User Schedule>  heparin  Injectable 1000 Unit(s) Dialysis. <User Schedule>  heparin  Injectable 500 Unit(s) Dialysis. <User Schedule>  heparin  Injectable 5000 Unit(s) SubCutaneous every 8 hours  hydrALAZINE 100 milliGRAM(s) Oral two times a day  lidocaine/prilocaine Cream 1 Application(s) Topical <User Schedule>  NIFEdipine XL 90 milliGRAM(s) Oral daily  pantoprazole    Tablet 40 milliGRAM(s) Oral before breakfast  sertraline 100 milliGRAM(s) Oral daily  sucralfate 1 Gram(s) Oral four times a day    MEDICATIONS  (PRN):  acetaminophen   Tablet .. 650 milliGRAM(s) Oral every 6 hours PRN Mild Pain (1 - 3)  mirtazapine 7.5 milliGRAM(s) Oral at bedtime PRN insomnia      Allergies    doxazosin (Hives)  Doxazosin Mesylate (Unknown)    Intolerances        PHYSICAL EXAM:  Vital Signs Last 24 Hrs  T(F): 97.4 (02-04-19 @ 10:54)  HR: 67 (02-04-19 @ 10:54)  BP: 144/58 (02-04-19 @ 10:54)  RR: 18 (02-04-19 @ 10:54)    GENERAL: NAD, well-groomed, well-developed  HEAD:  Atraumatic, Normocephalic  Neuro:  Awake, alert, no aphasia  CN: PERRL, EOMI, no nystagmus, no facial weakness, tongue other than subtle asymmetry in nasolabial fold, less prominent on the right,  protrudes in the midline  motor: normal tone, no pronator drift, full strength in all four extremities  sensory: intact to light touch  coordination: finger to nose intact bilaterally  DTRs: symmetric, plantar responses flexor bilaterally    LABS:                        10.7   8.05  )-----------( 262      ( 03 Feb 2019 05:22 )             32.5     02-03    140  |  102  |  30<H>  ----------------------------<  85  3.8   |  30  |  6.23<H>    Ca    8.7      03 Feb 2019 05:22            RADIOLOGY & ADDITIONAL STUDIES:      CT head 2/1/19:  No evidence of acute intracranial abnormality.  No evidence of   hemorrhage.      Chronic lacunar infarct as above.      MRI brain/MRA brain/MRA neck 2/1/19:  MRI brain: No acute infarct. Multiple chronic lacunar infarcts in  bilateral basal ganglia.  Patchy foci of T2/FLAIR hyperintensity in the   periventricular white matter, compatible with moderate to severe chronic   microvascular ischemic changes.     MRA brain: No hemodynamically significant stenosis.    MRA neck:  No hemodynamically significant stenosis.
63M.  admitted 02/01/2019.  presented from his psychotherapist's office in [place] to ED due to a L sided facial droop.  this was noted by the therapist (not the patient, as he purportedly never looks at himself in the mirror).  EMS was called.  Code stroke was not called at triage, has it was unknown onset of symptom.    incidentally, patient was admitted to [facility] 3 weeks prior to current admission due to "black stool."  patient underwent an EGD which found an "old, nonbleeding ucler" according to patient.  additionally, we was informed he had an, "irregular heart beat."  cardiology w/u at that time was unrevealing and were unable to document a reoccurrence.      2/3: Patient seen and evaluated at bedside. No complaints at this time. Awaiting EP Cardiology evaluation; spoke to Dr Trujillo cardiology ok to d/c from cardiac standpoint after consult with EP Cardiology with instructions to patient to follow up with outpatient cardiology and PCP.     Review of Systems:  CONSTITUTIONAL: No weakness, fevers or chills; unkempt  EYES/ENT: No visual changes;  No vertigo or throat pain   NECK: No pain or stiffness  RESPIRATORY: No cough, wheezing, hemoptysis; No shortness of breath,   CARDIOVASCULAR: No chest pain or palpitations  GASTROINTESTINAL: No abdominal or epigastric pain. No nausea, vomiting, or hematemesis; No diarrhea or constipation.   GENITOURINARY: No dysuria, frequency or hematuria  NEUROLOGICAL: No numbness or weakness  SKIN: No itching, burning, rashes, or lesions   All other review of systems is negative unless indicated above    Vital Signs Last 24 Hrs  T(C): 36.7 (03 Feb 2019 05:36), Max: 37 (02 Feb 2019 15:30)  T(F): 98 (03 Feb 2019 05:36), Max: 98.6 (02 Feb 2019 15:30)  HR: 61 (03 Feb 2019 05:36) (60 - 70)  BP: 120/59 (03 Feb 2019 05:36) (120/59 - 143/74)  BP(mean): --  RR: 16 (02 Feb 2019 20:54) (16 - 17)  SpO2: 93% (03 Feb 2019 05:36) (93% - 98%)    Physical Exam:  Constitutional: NAD, awake and alert, well-developed; obese  Neck: Soft and supple, No LAD, No JVD  Respiratory: cta b/l no wheezing no rhonchi  Cardiovascular: +s1/s2  Gastrointestinal: soft nt nd bs+  Extremities: no edema b/l le  Vascular: 2+ peripheral pulses  Neurological: A/O x 3, minimal asymmetry of mouth  Musculoskeletal: 5/5 strength b/l upper and lower extremities    MEDICATIONS  (STANDING):  aspirin 325 milliGRAM(s) Oral daily  atorvastatin 40 milliGRAM(s) Oral at bedtime  cloNIDine 0.1 milliGRAM(s) Oral two times a day  docusate sodium 100 milliGRAM(s) Oral three times a day  epoetin rajwinder Injectable 8000 Unit(s) IV Push <User Schedule>  heparin  Injectable 1000 Unit(s) Dialysis. <User Schedule>  heparin  Injectable 500 Unit(s) Dialysis. <User Schedule>  heparin  Injectable 5000 Unit(s) SubCutaneous every 8 hours  hydrALAZINE 100 milliGRAM(s) Oral two times a day  lidocaine/prilocaine Cream 1 Application(s) Topical <User Schedule>  NIFEdipine XL 90 milliGRAM(s) Oral daily  pantoprazole    Tablet 40 milliGRAM(s) Oral before breakfast  sertraline 100 milliGRAM(s) Oral daily  sucralfate 1 Gram(s) Oral four times a day    MEDICATIONS  (PRN):  acetaminophen   Tablet .. 650 milliGRAM(s) Oral every 6 hours PRN Mild Pain (1 - 3)  mirtazapine 7.5 milliGRAM(s) Oral at bedtime PRN insomnia      I&O's Summary  02 Feb 2019 07:01  -  03 Feb 2019 07:00  --------------------------------------------------------  IN: 0 mL / OUT: 300 mL / NET: -300 mL      LABS:                        10.7   8.05  )-----------( 262      ( 03 Feb 2019 05:22 )             32.5   02-03    140  |  102  |  30<H>  ----------------------------<  85  3.8   |  30  |  6.23<H>    Ca    8.7      03 Feb 2019 05:22    TPro  8.2  /  Alb  3.2<L>  /  TBili  0.4  /  DBili  x   /  AST  19  /  ALT  25  /  AlkPhos  119  02-01    PT/INR - ( 01 Feb 2019 12:54 )   PT: 12.3 sec;   INR: 1.10 ratio      PTT - ( 01 Feb 2019 12:54 )  PTT:26.3 sec    Occult Blood, Feces (02.02.19 @ 20:01)    Occult Blood, Feces: Negative    Acute Hepatitis Panel (02.02.19 @ 16:45)    Hepatitis C Virus Interpretation: Nonreact    Hepatitis C Virus S/CO Ratio: 0.15 S/CO    Hepatitis B Core IgM Antibody: Nonreact    Hepatitis B Surface Antigen: Nonreact    Hepatitis A IgM Antibody: Nonreact    Hemoglobin A1C with Mean Plasma Glucose (02.02.19 @ 06:37)    Hemoglobin A1C, Whole Blood: 4.8    Mean Plasma Glucose: 91 mg/dL    Thyroid Stimulating Hormone, Serum: 1.25 uU/mL (02.01.19 @ 12:54)    CARDIAC MARKERS ( 01 Feb 2019 12:54 )  <0.015 ng/mL / x     / x     / x     / x        Radiology  < from: MR Angio Neck No Cont (02.01.19 @ 17:43) >  IMPRESSION:  MRI brain: No acute infarct. Multiple chronic lacunar infarcts in  bilateral basal ganglia.  Patchy foci of T2/FLAIR hyperintensity in the   periventricular white matter, compatible with moderate to severe chronic   microvascular ischemic changes.   MRA brain: No hemodynamically significant stenosis.  MRA neck:  No hemodynamically significant stenosis.  < end of copied text >    < from: CT Head No Cont (02.01.19 @ 14:25) >  IMPRESSION:     No evidence of acute intracranial abnormality.  No evidence of   hemorrhage.    Chronic lacunar infarct as above.  < end of copied text >    < from: Xray Chest 1 View- PORTABLE-Urgent (02.01.19 @ 13:38) >  IMPRESSION: No infiltrate.  < end of copied text >    DVT prophylaxis   - heparin SC and ambulation

## 2019-02-04 NOTE — PROGRESS NOTE ADULT - ATTENDING COMMENTS
on exam- comfortable   -p/a- soft, bs+    #ct asa/statin.   Loop implantation tomorrow and possible d/c     #Discussed with Dr. Wolf and Dr. Pearce
on exam-comfortable   -rs-aeeb, cta    #d/c today with further management as an outpt. Time spent 55 minutes

## 2019-02-04 NOTE — PACU DISCHARGE NOTE - COMMENTS
Report given to Niki Vidales pt verbalizes understanding of post op care education provided by [facility] [employer] [employer] verified on tele rsr

## 2019-02-04 NOTE — PROCEDURAL SAFETY CHECKLIST WITH OR WITHOUT SEDATION - NSPOSTCOMMENTFT_GEN_ALL_CORE
prep and draped as per protocol pt tolerated well suture dermabond applied by . pt denies any discomfort site clear dry intact no bleeding swelling noted education provided by [facility]uld[facility] [employer]

## 2019-02-04 NOTE — PROGRESS NOTE ADULT - PROVIDER SPECIALTY LIST ADULT
Cardiology
Electrophysiology
Family Medicine
Family Medicine
Hospitalist
Nephrology
Neurology
Hospitalist

## 2019-02-04 NOTE — PROGRESS NOTE ADULT - ASSESSMENT
63 year old man with multiple medical problems including ESRD who presented with left facial droop, time of onset is unclear.  It is not clear that this was an acute neurological event. MRI brain does not show any acute infarct and symptoms were persistent longer than the criteria for TIA.   Continue aspirin 325 mg daily.  Continue atorvastatin.  He is s/p loop recorder placement and will follow up with cardiology.  Please call back if further assistance is needed from neurology service.

## 2019-02-04 NOTE — PROGRESS NOTE ADULT - ASSESSMENT
patient with no acute event.  PVC's on telemetry  1-CAD-stable with no evidence of active CAD  2-CHF-no evidence of CHF  3-arrhythmia-seen by EPS and patient planned for loop implant  4-Neuro-no acute CVA, BP control and ASA suggested  no active issues at this time; will f/u prn as needed in the future-please reconsult if needed

## 2019-02-04 NOTE — PROGRESS NOTE ADULT - ASSESSMENT
63 male with PMHx of ESRD on dialysis (TTS), HTN and depression who was sent in due for evaluation of facial droop.     #TIA   - stable for discharge  - symptoms likely 2/2 TIA vs. old stroke  - imaging as above  - continue aspirin and atorvastatin  - neurology consult appreciated    #Hx "irregular" heart rhythm  - monitor on tele  - EP consulted:    - no arrhythmias noted; just PVC's; will be discharged with loop recorder  - cardiology consult appreciated     #Normocytic anemia  - stable  - Likely due to CKD vs. iron def anemia vs. anemia of chronic disease  - recently hospitalized for melena; s/p EGD which revealed "nonbleeding ulcer"  - continue procrit during dialysis  - continue sucralfate and protonix  - continue protonix  - monitor h/h  - fobt negative     #ESRD  - continue HD on MWF per nephrology  - nephrology consult appreciated    #HTN  - stable  - monitor blood pressure  - continue hydralazine, clonidine and nifedipine    #Depression  - stable   - follows w/ outpt psych  - continue sertraline and mirtazipine prn    #DVT prophylaxis   - subQ heparin 63 male with PMHx of ESRD on dialysis (TTS), HTN and depression who was sent in due for evaluation of facial droop.     #TIA   - stable for discharge  - symptoms likely 2/2 TIA  - imaging as above  - continue aspirin and atorvastatin  - neurology consult appreciated    #Hx "irregular" heart rhythm  - monitor on tele  - EP consulted:    - no arrhythmias noted; just PVC's; will be discharged with loop recorder  - cardiology consult appreciated     #Normocytic anemia  - stable  - Likely due to CKD vs. iron def anemia vs. anemia of chronic disease  - recently hospitalized for melena; s/p EGD which revealed "nonbleeding ulcer"  - continue procrit during dialysis  - continue sucralfate and protonix  - continue protonix  - monitor h/h  - fobt negative     #ESRD  - continue HD on MWF per nephrology  - nephrology consult appreciated    #HTN  - stable  - monitor blood pressure  - continue hydralazine, clonidine and nifedipine    #Depression  - stable   - follows w/ outpt psych  - continue sertraline and mirtazipine prn    #DVT prophylaxis   - subQ heparin 63 male with PMHx of ESRD on dialysis (TTS), HTN and depression who was sent in due for evaluation of facial droop.     #Most likely due to old stroke   - stable for discharge  - symptoms likely 2/2 TIA  - imaging as above  - continue aspirin and atorvastatin  - neurology consult appreciated    #Hx "irregular" heart rhythm  - monitor on tele  - EP consulted:    - no arrhythmias noted; just PVC's; will be discharged with loop recorder  - cardiology consult appreciated     #Normocytic anemia  - stable  - Likely due to CKD vs. iron def anemia vs. anemia of chronic disease  - recently hospitalized for melena; s/p EGD which revealed "nonbleeding ulcer"  - continue procrit during dialysis  - continue sucralfate and protonix  - continue protonix  - monitor h/h  - fobt negative     #ESRD  - continue HD on MWF per nephrology  - nephrology consult appreciated    #HTN  - stable  - monitor blood pressure  - continue hydralazine, clonidine and nifedipine    #Depression  - stable   - follows w/ outpt psych  - continue sertraline and mirtazipine prn    #DVT prophylaxis   - subQ heparin

## 2019-02-04 NOTE — PROGRESS NOTE ADULT - REASON FOR ADMISSION
Patient is a 63y old  Male who presents with a chief complaint of left facial droop.

## 2019-02-04 NOTE — PROGRESS NOTE ADULT - ASSESSMENT
62 y/o ESRD on TTS with TIA symptoms.     PLAN  - s/p loop recorder, work-up neg thus far, neuro input noted  - HD in am with epo, hold meds per hd via avg  - HTN: BP stable

## 2019-02-15 ENCOUNTER — APPOINTMENT (OUTPATIENT)
Dept: ELECTROPHYSIOLOGY | Facility: CLINIC | Age: 64
End: 2019-02-15

## 2019-02-25 ENCOUNTER — APPOINTMENT (OUTPATIENT)
Dept: NEUROLOGY | Facility: CLINIC | Age: 64
End: 2019-02-25
Payer: MEDICARE

## 2019-02-25 VITALS
HEART RATE: 87 BPM | DIASTOLIC BLOOD PRESSURE: 81 MMHG | WEIGHT: 295 LBS | HEIGHT: 70 IN | SYSTOLIC BLOOD PRESSURE: 126 MMHG | BODY MASS INDEX: 42.23 KG/M2

## 2019-02-25 DIAGNOSIS — Z00.00 ENCOUNTER FOR GENERAL ADULT MEDICAL EXAMINATION W/OUT ABNORMAL FINDINGS: ICD-10-CM

## 2019-02-25 PROCEDURE — 99214 OFFICE O/P EST MOD 30 MIN: CPT

## 2019-02-25 RX ORDER — ARIPIPRAZOLE 2 MG/1
2 TABLET ORAL
Refills: 0 | Status: ACTIVE | COMMUNITY

## 2019-02-25 RX ORDER — MIRTAZAPINE 7.5 MG/1
7.5 TABLET, FILM COATED ORAL
Refills: 0 | Status: ACTIVE | COMMUNITY

## 2019-02-25 RX ORDER — SUCRALFATE 1 G/1
1 TABLET ORAL
Refills: 0 | Status: ACTIVE | COMMUNITY

## 2019-02-25 RX ORDER — CALCIUM ACETATE 667 MG/1
667 TABLET ORAL
Refills: 0 | Status: ACTIVE | COMMUNITY

## 2019-02-25 RX ORDER — ASPIRIN 325 MG/1
325 TABLET, FILM COATED ORAL
Refills: 0 | Status: ACTIVE | COMMUNITY

## 2019-02-25 RX ORDER — ATORVASTATIN CALCIUM 40 MG/1
40 TABLET, FILM COATED ORAL
Refills: 0 | Status: ACTIVE | COMMUNITY

## 2019-02-25 RX ORDER — MAGNESIUM OXIDE 241.3 MG/1000MG
400 TABLET ORAL
Refills: 0 | Status: ACTIVE | COMMUNITY

## 2019-02-25 RX ORDER — EPOETIN ALFA 20000 [IU]/ML
SOLUTION INTRAVENOUS; SUBCUTANEOUS
Refills: 0 | Status: ACTIVE | COMMUNITY

## 2019-02-25 RX ORDER — PANTOPRAZOLE 40 MG/1
40 TABLET, DELAYED RELEASE ORAL
Refills: 0 | Status: ACTIVE | COMMUNITY

## 2019-02-25 RX ORDER — HYDRALAZINE HYDROCHLORIDE 100 MG/1
100 TABLET ORAL
Refills: 0 | Status: ACTIVE | COMMUNITY

## 2019-02-25 RX ORDER — CLONIDINE HYDROCHLORIDE 0.1 MG/1
0.1 TABLET ORAL
Refills: 0 | Status: ACTIVE | COMMUNITY

## 2019-02-25 NOTE — DATA REVIEWED
[de-identified] : MRI brain/MRA brain/MRA neck 2/1/19:\par MRI brain: no acute infarct. Multiple chronic lacunar infarcts in bilateral basal ganglia. Patchy foci of T2/FLAIR hyperintensity in the periventricular white matter, compatible with moderate to severe chronic microvascular ischemic changes.\par MRA brain: no hemodynamically significant stenosis.\par MRA neck: no hemodynamically significant stenosis. [de-identified] : CT head 2/1/19: No evidence of acute intracranial abnormality. NO evidence of hemorrhage. \par Chronic lacunar infarcts in the left anterior \par basal ganglia and in the right corona radiata and right posterior basal \par \par MRI lumbar spine 4/20/17: \par L4/5, L3/4, L2/3 and L1/2 disc herniations deforming the thecal sac, with L2/3-L4/5 bilateral neural foraminal extension abutting the exiting nerve roots, L4/5 abutment of the proximal L5 nerve roots bilaterally, L4/5 and L3/4 left neural foraminal narrowing, L4/5 and L2/3 central spinal stenosis in conjunction with facet and ligamentous hypertrophic changes (mild at L4/5, moderate at L2/3) in addition to L1/2 Grade I retrolisthesis and left lateral recess extension abutting the left L2 nerve root.\par \par L5/S1 disc bulge.\par Multilevel disc degenerative changes\par Dextroscoliosis.\par ganglia.

## 2019-02-25 NOTE — HISTORY OF PRESENT ILLNESS
[FreeTextEntry1] : Mr. Hernandez is a 63 year old man who was seen in [facility] and is here today for follow-up. He was admitted on 2/1/19. he was at a session with his therapist where a left sided facial droop was noticed. The patient had not noticed this symptom himself, but reported that the day prior he was at dialysis and no mention was made about a facial droop.\par He was seen in another hospital several weeks prior and was told that he had an irregular heart beat. \par Imaging did not reveal any new stroke. \par Subtle left facial droop (flattening of nasolabial fold) was seen after 24 hours making this less suggestive of TIA.\par He was continued on aspirin and atorvastatin. A loop recorder was placed. \par \par He reports an unsteadiness in the lower left leg. He says that this has been a problem for seven years. \par He denies low back pain but says that he has had an MRI of his lower back. He was told that he had herniated discs. He thinks that it has become weaker more often.\par He did home physical therapy. He says that it is difficult to make time for outpatient PT since he has dialysis three times per week. \par He says that he has pain in his left toes that is new today. Numbness and weakness is chronic.\par \par He denies having any new sudden onset of focal weakness, numbness or language disturbances.\par \par He has not yet followed up with cardiology to interrogate his loop recorder.\par He reports that next month he will be seen at [facility] for an MRI of his heart. \par \par He has seen Dr. Lezama previously for weakness. An EMG showed evidence of axonal neuropathy. He had normal repetitive nerve stimulation and labs for possible myasthenia gravis were normal. \par \par He reports frustration that his home care agency has sent him a home health aid that he has difficulty communicating with due to language barrier. \par \par He is not aware of snoring. He sleeps alone. He does not report waking up feeling like he is choking or gasping for air.

## 2019-02-25 NOTE — PHYSICAL EXAM
[FreeTextEntry1] : Examination:\par Constitutional: normal, no apparent distress, unkempt\par Eyes: normal conjunctiva b/l, no ptosis, visual fields full\par Respiratory: no respiratory distress, normal effort, normal auscultation\par Cardiovascular: normal rate, rhythm, no murmurs\par Neck: supple, no masses\par Vascular: carotids normal\par Skin: normal color, no rashes\par Psych: normal mood, affect\par \par Neurological:\par Memory: normal memory, oriented to person, place, time\par Language intact/no aphasia\par Cranial Nerves: II-XII intact, Pupils equally round and reactive to light, ocular muscles/movements intact, no ptosis, no facial weakness, tongue protrudes normally in the midline, \par Motor: normal tone, no pronator drift, full strength in bilateral upper extremities and right lower extremity, 4+/5 in left hip flexion, otherwise full strength in the lower leg. \par Coordination: Fine motor movements intact, rapid alternating movements intact, finger to nose intact bilaterally\par Sensory: intact to light touch\par DTRs: symmetric, normal\par Gait:hemiparetic\par

## 2019-02-25 NOTE — CONSULT LETTER
[Dear  ___] : Dear  [unfilled], [Consult Letter:] : I had the pleasure of evaluating your patient, [unfilled]. [Please see my note below.] : Please see my note below. [Consult Closing:] : Thank you very much for allowing me to participate in the care of this patient.  If you have any questions, please do not hesitate to contact me. [FreeTextEntry2] : Dariel Burnett [FreeTextEntry3] : Sincerely,\par \par \par Kym Wolf MD\par Diplomate, American Academy of Psychiatry and Neurology\par Board Certified in the Subspecialty of Clinical Neurophysiology\par Board Certified in the Subspecialty of Sleep Medicine\par Board Certified in the Subspecialty of Epilepsy\par

## 2019-02-25 NOTE — REVIEW OF SYSTEMS
[Anxiety] : anxiety [Depression] : depression [As Noted in HPI] : as noted in HPI [Negative] : Musculoskeletal [de-identified] : imbalance

## 2019-02-25 NOTE — DISCUSSION/SUMMARY
[FreeTextEntry1] : Mr. Hernandez is a 63 year old man with multiple medical problems who was admitted to [facility] on 2/1/19 with left facial droop.\par Imaging did not show evidence of any acute infarcts but there was evidence of prior lacunar infarcts.\par He has chronic proximal left lower extremity weakness.\par \par 1. History of stroke - No new infarct to explain left facial droop. This may have been an exacerbation of a chronic deficit.\par Continue aspirin 325 mg/daily. \par Continue atorvastatin.\par Follow-up with cardiology.\par We discussed looking for other risk factors for stroke such as obstructive sleep apnea. He is concerned about having time for more testing. I will discuss with him again at follow-up, doing a home sleep study for possible KATELYN.\par \par 2. 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 \par \par \par

## 2019-03-07 ENCOUNTER — APPOINTMENT (OUTPATIENT)
Dept: ELECTROPHYSIOLOGY | Facility: CLINIC | Age: 64
End: 2019-03-07
Payer: MEDICARE

## 2019-03-07 PROCEDURE — 93299: CPT

## 2019-03-07 PROCEDURE — 93298 REM INTERROG DEV EVAL SCRMS: CPT

## 2019-04-07 ENCOUNTER — APPOINTMENT (OUTPATIENT)
Dept: ELECTROPHYSIOLOGY | Facility: CLINIC | Age: 64
End: 2019-04-07
Payer: MEDICARE

## 2019-04-07 PROCEDURE — 93298 REM INTERROG DEV EVAL SCRMS: CPT

## 2019-04-07 PROCEDURE — 93299: CPT

## 2019-05-08 ENCOUNTER — APPOINTMENT (OUTPATIENT)
Dept: ELECTROPHYSIOLOGY | Facility: CLINIC | Age: 64
End: 2019-05-08
Payer: MEDICARE

## 2019-05-08 PROCEDURE — 93299: CPT

## 2019-05-08 PROCEDURE — 93298 REM INTERROG DEV EVAL SCRMS: CPT

## 2019-05-29 ENCOUNTER — APPOINTMENT (OUTPATIENT)
Dept: NEUROLOGY | Facility: CLINIC | Age: 64
End: 2019-05-29
Payer: MEDICARE

## 2019-05-29 VITALS
HEART RATE: 62 BPM | WEIGHT: 304 LBS | BODY MASS INDEX: 43.52 KG/M2 | HEIGHT: 70 IN | DIASTOLIC BLOOD PRESSURE: 84 MMHG | SYSTOLIC BLOOD PRESSURE: 142 MMHG

## 2019-05-29 PROCEDURE — 99213 OFFICE O/P EST LOW 20 MIN: CPT

## 2019-05-29 NOTE — PHYSICAL EXAM
[FreeTextEntry1] : Examination:\par Constitutional: normal, no apparent distress, unkempt, hair matted, poor dentition\par Eyes: normal conjunctiva b/l, no ptosis, visual fields full\par Respiratory: no respiratory distress, normal effort, normal auscultation\par Cardiovascular: normal rate, rhythm, no murmurs\par Neck: supple, no masses\par Vascular: carotids normal\par Skin: normal color, no rashes\par Psych: normal mood, affect\par \par Neurological:\par Memory: normal memory, oriented to person, place, time\par Language intact/no aphasia\par Cranial Nerves: II-XII intact, Pupils equally round and reactive to light, ocular muscles/movements intact, no ptosis, no facial weakness, tongue protrudes normally in the midline, \par Motor: normal tone, no pronator drift, full strength in bilateral upper extremities and right lower extremity, 4+/5 in left hip flexion, otherwise full strength in the lower leg. \par Coordination: Fine motor movements intact, rapid alternating movements intact, finger to nose intact bilaterally\par Sensory: intact to light touch\par DTRs: symmetric\par Gait:hemiparetic

## 2019-05-29 NOTE — DISCUSSION/SUMMARY
[FreeTextEntry1] : Mr. Hernandez is a 63 year old man with multiple medical problems who was admitted to [facility] on 2/1/19 with left facial droop.\par Imaging did not show evidence of any acute infarcts but there was evidence of prior lacunar infarcts.\par He has chronic proximal left lower extremity weakness.\par \par 1. History of stroke - No new infarct to explain left facial droop for which he was hospitalized in February 2019. This may have been an exacerbation of a chronic deficit.\par Continue aspirin 325 mg/daily. \par Continue atorvastatin.\par Follow-up with cardiology.\par We discussed looking for other risk factors for stroke such as obstructive sleep apnea. He is concerned about scheduling more tests. He is not aware of snoring. I have asked him to ask any dialysis nurses or his home health aides about snoring. If this is present a home sleep study will be scheduled.\par \par 2. 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 \par \par Follow-up in three months, sooner if needed.

## 2019-05-29 NOTE — CONSULT LETTER
[Dear  ___] : Dear  [unfilled], [Consult Letter:] : I had the pleasure of evaluating your patient, [unfilled]. [Consult Closing:] : Thank you very much for allowing me to participate in the care of this patient.  If you have any questions, please do not hesitate to contact me. [Please see my note below.] : Please see my note below. [FreeTextEntry2] : Dariel Burnett [FreeTextEntry3] : Sincerely,\par \par \par Kym Wolf MD\par Diplomate, American Academy of Psychiatry and Neurology\par Board Certified in the Subspecialty of Clinical Neurophysiology\par Board Certified in the Subspecialty of Sleep Medicine\par Board Certified in the Subspecialty of Epilepsy\par

## 2019-05-29 NOTE — DATA REVIEWED
[de-identified] : MRI brain/MRA brain/MRA neck 2/1/19:\par MRI brain: no acute infarct. Multiple chronic lacunar infarcts in bilateral basal ganglia. Patchy foci of T2/FLAIR hyperintensity in the periventricular white matter, compatible with moderate to severe chronic microvascular ischemic changes.\par MRA brain: no hemodynamically significant stenosis.\par MRA neck: no hemodynamically significant stenosis. [de-identified] : CT head 2/1/19: No evidence of acute intracranial abnormality. NO evidence of hemorrhage. \par Chronic lacunar infarcts in the left anterior \par basal ganglia and in the right corona radiata and right posterior basal \par \par MRI lumbar spine 4/20/17: \par L4/5, L3/4, L2/3 and L1/2 disc herniations deforming the thecal sac, with L2/3-L4/5 bilateral neural foraminal extension abutting the exiting nerve roots, L4/5 abutment of the proximal L5 nerve roots bilaterally, L4/5 and L3/4 left neural foraminal narrowing, L4/5 and L2/3 central spinal stenosis in conjunction with facet and ligamentous hypertrophic changes (mild at L4/5, moderate at L2/3) in addition to L1/2 Grade I retrolisthesis and left lateral recess extension abutting the left L2 nerve root.\par \par L5/S1 disc bulge.\par Multilevel disc degenerative changes\par Dextroscoliosis.\par ganglia.

## 2019-05-29 NOTE — HISTORY OF PRESENT ILLNESS
[FreeTextEntry1] : Mr. Hernandez is here today for follow-up. \par He reports that he is in a study for patients on dialysis and he had a cardiac MRI which showed that his "heart is stiffer". He has a follow-up appointment with his cardiologist next week.\par He was told that he has "extra beats" on his EKG.\par He has not been aware of any new stroke like symptoms including new facial droop, focal weakness, numbness/tingling or language difficulties.\par He reports chronic left sided weakness.\par He has not gone to PT yet because he has a lot going on and may be moving.\par He is not having left toe pain at this time. \par He reports having multifocal back spasms which he thinks may be due to dehydration. He notices this more when more fluid is removed in dialysis.

## 2019-06-08 ENCOUNTER — APPOINTMENT (OUTPATIENT)
Dept: ELECTROPHYSIOLOGY | Facility: CLINIC | Age: 64
End: 2019-06-08
Payer: MEDICARE

## 2019-06-08 PROCEDURE — 93299: CPT

## 2019-06-08 PROCEDURE — 93298 REM INTERROG DEV EVAL SCRMS: CPT

## 2019-07-09 ENCOUNTER — APPOINTMENT (OUTPATIENT)
Dept: ELECTROPHYSIOLOGY | Facility: CLINIC | Age: 64
End: 2019-07-09
Payer: MEDICARE

## 2019-07-09 DIAGNOSIS — Z98.890 OTHER SPECIFIED POSTPROCEDURAL STATES: ICD-10-CM

## 2019-07-09 PROCEDURE — 93299: CPT

## 2019-07-09 PROCEDURE — 93298 REM INTERROG DEV EVAL SCRMS: CPT

## 2019-07-26 PROBLEM — Z98.890 HISTORY OF LOOP RECORDER: Status: ACTIVE | Noted: 2019-06-12

## 2019-08-09 ENCOUNTER — APPOINTMENT (OUTPATIENT)
Dept: ELECTROPHYSIOLOGY | Facility: CLINIC | Age: 64
End: 2019-08-09
Payer: MEDICARE

## 2019-08-09 PROCEDURE — 93299: CPT

## 2019-08-09 PROCEDURE — 93298 REM INTERROG DEV EVAL SCRMS: CPT

## 2019-09-09 ENCOUNTER — APPOINTMENT (OUTPATIENT)
Dept: ELECTROPHYSIOLOGY | Facility: CLINIC | Age: 64
End: 2019-09-09
Payer: MEDICARE

## 2019-09-09 PROCEDURE — 93298 REM INTERROG DEV EVAL SCRMS: CPT

## 2019-09-09 PROCEDURE — 93299: CPT

## 2019-09-18 ENCOUNTER — APPOINTMENT (OUTPATIENT)
Dept: NEUROLOGY | Facility: CLINIC | Age: 64
End: 2019-09-18
Payer: MEDICARE

## 2019-09-18 VITALS
BODY MASS INDEX: 45.1 KG/M2 | HEART RATE: 88 BPM | DIASTOLIC BLOOD PRESSURE: 86 MMHG | HEIGHT: 70 IN | WEIGHT: 315 LBS | SYSTOLIC BLOOD PRESSURE: 138 MMHG

## 2019-09-18 DIAGNOSIS — I63.9 CEREBRAL INFARCTION, UNSPECIFIED: ICD-10-CM

## 2019-09-18 DIAGNOSIS — G62.9 POLYNEUROPATHY, UNSPECIFIED: ICD-10-CM

## 2019-09-18 DIAGNOSIS — M54.16 RADICULOPATHY, LUMBAR REGION: ICD-10-CM

## 2019-09-18 PROCEDURE — 99213 OFFICE O/P EST LOW 20 MIN: CPT

## 2019-09-18 NOTE — CONSULT LETTER
[Consult Letter:] : I had the pleasure of evaluating your patient, [unfilled]. [Dear  ___] : Dear  [unfilled], [Please see my note below.] : Please see my note below. [Consult Closing:] : Thank you very much for allowing me to participate in the care of this patient.  If you have any questions, please do not hesitate to contact me. [FreeTextEntry2] : Dariel Burnett [FreeTextEntry3] : Sincerely,\par \par \par Kym Wolf MD\par Diplomate, American Academy of Psychiatry and Neurology\par Board Certified in the Subspecialty of Clinical Neurophysiology\par Board Certified in the Subspecialty of Sleep Medicine\par Board Certified in the Subspecialty of Epilepsy\par

## 2019-09-18 NOTE — HISTORY OF PRESENT ILLNESS
[FreeTextEntry1] : I last saw Mr. Hernandez on 5/29/19.\par He reports having had two falls in the last month.\par The first time he was trying to walk up onto a curb. He says that his left leg did not lift high enough and he went down. \par He says that his blood pressure was low when EMTs took it at the scene. He believes that this was a cardiac related issue.\par He does not believe that he blacked out although  says that he cannot rule out 1-2 seconds of loss of consciousness. He was taken to [facility] and was in the ER for about 5 hours. X-rays of the knees and a chest x ray did not show anything remarkable.\par He had dialysis the previous day.\par His cardiologist has decreased his clonidine dose. \par \par Two weeks later he fell and landed on his right leg in his apartment. He thinks that his foot caught on the carpet and he lost his balance. He was able to get up on his own.\par \par He still has left leg weakness.\par He denies having back pain.\par \par He is having a hard time dealing with the uncertainty about his living situation. He is supposed to be getting a new apartment.\par \par He says that one afternoon after dialysis he lied down in dialysis on his left side. When he woke up from a nap he had weakness of his right foot that lasted for 4-5 minutes.\par \par He is not sure if his loop recorder has been interrogated.

## 2019-09-18 NOTE — DISCUSSION/SUMMARY
[FreeTextEntry1] : Mr. Hernandez is a 63 year old man with multiple medical problems who was admitted to [facility] on 2/1/19 with left facial droop.\par Imaging did not show evidence of any acute infarcts but there was evidence of prior lacunar infarcts.\par He has chronic proximal left lower extremity weakness.\par \par 1. History of stroke - No new infarct to explain left facial droop for which he was hospitalized in February 2019. This may have been an exacerbation of a chronic deficit.\par Continue aspirin 325 mg/daily. \par Continue atorvastatin.\par Follow-up with cardiology.\par He continues to decline sleep study testing because he has not been told that he snores.\par \par 2. 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 \par Recent episode of transient right leg weakness may have been due to nerve compression during sleep. \par \par 3. Falls - Possibly related to leg weakness. Unclear if there has been syncope. Interrogation of loop recorder. Physical therapy.\par \par We also discussed the importance of weight loss.\par \par He should return to the hospital with any stroke like symptoms. \par \par Follow-up in four months, sooner if needed. \par

## 2019-09-18 NOTE — PHYSICAL EXAM
[FreeTextEntry1] : Examination:\par Constitutional: normal, no apparent distress, unkempt, hair matted, poor dentition\par Eyes: normal conjunctiva b/l, no ptosis, visual fields full\par Respiratory: no respiratory distress, normal effort, normal auscultation\par Cardiovascular: normal rate, rhythm, no murmurs\par Neck: supple, no masses\par Vascular: carotids normal\par Skin: normal color, no rashes\par Psych: normal mood, affect\par \par Neurological:\par Memory: normal memory, oriented to person, place, time\par Language intact/no aphasia\par Cranial Nerves: II-XII intact, Pupils equally round and reactive to light, ocular muscles/movements intact, no ptosis, no facial weakness, tongue protrudes normally in the midline, \par Motor: normal tone, no pronator drift, full strength in bilateral upper extremities and right lower extremity, 4+/5 in left hip flexion, otherwise full strength in the lower leg. \par Coordination: Fine motor movements intact, rapid alternating movements intact, finger to nose intact bilaterally\par Sensory: intact to light touch\par DTRs: symmetric\par Gait:hemiparetic.

## 2019-10-10 ENCOUNTER — APPOINTMENT (OUTPATIENT)
Dept: ELECTROPHYSIOLOGY | Facility: CLINIC | Age: 64
End: 2019-10-10
Payer: MEDICARE

## 2019-10-10 PROCEDURE — 93299: CPT

## 2019-10-10 PROCEDURE — 93298 REM INTERROG DEV EVAL SCRMS: CPT

## 2020-01-15 ENCOUNTER — APPOINTMENT (OUTPATIENT)
Dept: NEUROLOGY | Facility: CLINIC | Age: 65
End: 2020-01-15

## 2020-01-26 NOTE — ED ADULT TRIAGE NOTE - BSA (M2)
Fever, cough, sore throat since yesterday.  Mom gave ibuprofen and tylenol at 0500.  Body aches.  Throat slightly red.  Croupy cough.  No trachael tug or retractions noted.  Lungs clear and equal.  Bilateral eye redness and drainage noted.   
2.41

## 2020-10-22 NOTE — ED ADULT TRIAGE NOTE - TEMPERATURE IN CELSIUS (DEGREES C)
Liberally use moisturizing cream and can try hydroxyzine 10 mg every 6-8 hours p.r.n. itching, prescription was sent   36.6

## 2021-10-17 ENCOUNTER — FORM ENCOUNTER (OUTPATIENT)
Age: 66
End: 2021-10-17

## 2023-01-14 NOTE — PROGRESS NOTE ADULT - ASSESSMENT
Patient is a 32 M with Marfan syndrome, T1DM with insulin pump at home admitted for aortic root repair.  Consulted for: T1DM with insulin pump at home.  Endocrine team consulted for uncontrolled diabetes. Patient is high risk with high level decision making due to on insulin pump with uncontrolled diabetes which places patient at high risk for cardiovascular and cerebrovascular events. Patient with lability of glucose requiring close monitoring and insulin adjustments.    #Uncontrolled T1DM with insulin pump use at home  A1c 8.4%, Goal < 7%  At home, uses Medtronic 770G with Admelog, uses Guardian sensor. On Auto mode. IC 1:11, ISF 1:40, Target 110, AIT 3 hrs.   When not in Auto mode, basal rate is 23 units in a day     Plan:  -Now back on insulin pump; Medtronic 770G with Guardian sensor in SMARTGUARD™ AUTO MODE which will control basal rate of insulin.   -Check FS qAC and qHS once back on insulin pump  -Patient will administer boluses based on glucoses readings and carb counts. Nursing can document these amounts in insulin flowsheets  -Insulin pump attestation form completed in chart  -Insulin pump order  placed in sunrise order.      -If for any reason, pump needs to be removed/disconnected or patient can no longer operate it - please give Lantus 23 units STAT and disconnect insulin pump 1 hour later. In that case, patient will need to be ordered for Admelog 5 units qAC and low dose correctional scale before each meal and low dose correctional scale at bedtime    # HLD   - Continue with atorvastatin 80 mg daily     # HTN  - Manage per primary team     For all urgent matters, can call answering service at [phone] (weekdays); [phone] (nights/weekends)  Any non-urgent consults or questions can be emailed to [email or web address] or [email or web address]     63 year old man with multiple medical problems including ESRD who presented with left facial droop, time of onset is unclear.  He has no evidence of acute infarct or significant arterial stenosis on MRI/MRA.  Left facial droop may be due to one of his old lacunar infarcts so would not necessarily start a second anti-platelet agent at this time.  Continue aspirin 325 mg daily.  Continue atorvastatin.  He reports having an "irregular heart rate" while admitted at [facility]. Monitor on telemetry.